# Patient Record
Sex: FEMALE | Race: WHITE | NOT HISPANIC OR LATINO
[De-identification: names, ages, dates, MRNs, and addresses within clinical notes are randomized per-mention and may not be internally consistent; named-entity substitution may affect disease eponyms.]

---

## 2017-02-01 ENCOUNTER — CHARTING TRANS (OUTPATIENT)
Dept: OTHER | Age: 47
End: 2017-02-01

## 2017-02-01 ENCOUNTER — LAB SERVICES (OUTPATIENT)
Dept: OTHER | Age: 47
End: 2017-02-01

## 2017-02-03 LAB — BACTERIA UR CULT: NORMAL

## 2017-03-24 ENCOUNTER — MED REC SCAN ONLY (OUTPATIENT)
Dept: FAMILY MEDICINE CLINIC | Facility: CLINIC | Age: 47
End: 2017-03-24

## 2017-07-22 ENCOUNTER — HOSPITAL (OUTPATIENT)
Dept: OTHER | Age: 47
End: 2017-07-22
Attending: EMERGENCY MEDICINE

## 2017-07-22 LAB
ANALYZER ANC (IANC): NORMAL
ANION GAP SERPL CALC-SCNC: 16 MMOL/L (ref 10–20)
APPEARANCE UR: CLEAR
BASO+EOS+MONOS # BLD: 0.5 THOUSAND/MCL (ref 0.1–1.1)
BASO+EOS+MONOS NFR BLD: 9 %
BILIRUB UR QL STRIP: NEGATIVE
BUN SERPL-MCNC: 13 MG/DL (ref 6–20)
BUN/CREAT SERPL: 26 (ref 7–25)
CHLORIDE: 105 MMOL/L (ref 98–107)
CO2 SERPL-SCNC: 25 MMOL/L (ref 21–32)
COLOR UR: YELLOW
CREAT SERPL-MCNC: 0.5 MG/DL (ref 0.51–0.95)
DIFFERENTIAL METHOD BLD: NORMAL
ERYTHROCYTE [DISTWIDTH] IN BLOOD: 12.6 % (ref 11–15)
GLUCOSE UR STRIP-MCNC: NEGATIVE MG/DL
HEMATOCRIT: 38.3 % (ref 36–46.5)
HEMOCCULT STL QL: ABNORMAL
HGB BLD-MCNC: 13.3 GM/DL (ref 12–15.5)
KETONES UR STRIP-MCNC: NEGATIVE MG/DL
LEUKOCYTE ESTERASE UR QL STRIP: NEGATIVE
LYMPHOCYTES # BLD: 1.1 THOUSAND/MCL (ref 1–4.8)
LYMPHOCYTES NFR BLD: 20 %
MCH RBC QN AUTO: 32.3 PG (ref 26–34)
MCHC RBC AUTO-ENTMCNC: 34.7 GM/DL (ref 32–36.5)
MCV RBC AUTO: 93 FL (ref 78–100)
NEUTROPHILS # BLD: 3.9 THOUSAND/MCL (ref 1.8–7.7)
NEUTROPHILS NFR BLD: 71 %
NEUTS SEG NFR BLD: NORMAL %
NITRITE UR QL STRIP: NEGATIVE
PH UR STRIP: 7 UNIT (ref 5–7)
PLATELET # BLD: 246 THOUSAND/MCL (ref 140–450)
POTASSIUM SERPL-SCNC: 4.5 MMOL/L (ref 3.4–5.1)
PROT UR STRIP-MCNC: NEGATIVE MG/DL
RBC # BLD: 4.12 MILLION/MCL (ref 4–5.2)
SODIUM SERPL-SCNC: 141 MMOL/L (ref 135–145)
SP GR UR STRIP: 1.01 (ref 1–1.03)
UROBILINOGEN UR STRIP-MCNC: 0.2 MG/DL (ref 0–1)
WBC # BLD: 5.5 THOUSAND/MCL (ref 4.2–11)

## 2017-10-24 ENCOUNTER — OFFICE VISIT (OUTPATIENT)
Dept: FAMILY MEDICINE CLINIC | Facility: CLINIC | Age: 47
End: 2017-10-24

## 2017-10-24 VITALS
HEIGHT: 62 IN | HEART RATE: 86 BPM | TEMPERATURE: 98 F | BODY MASS INDEX: 23 KG/M2 | WEIGHT: 125 LBS | SYSTOLIC BLOOD PRESSURE: 108 MMHG | OXYGEN SATURATION: 99 % | DIASTOLIC BLOOD PRESSURE: 74 MMHG

## 2017-10-24 DIAGNOSIS — K64.4 BLEEDING EXTERNAL HEMORRHOIDS: ICD-10-CM

## 2017-10-24 DIAGNOSIS — Z23 NEED FOR VACCINATION: ICD-10-CM

## 2017-10-24 DIAGNOSIS — K62.5 RECTAL BLEEDING: Primary | ICD-10-CM

## 2017-10-24 PROCEDURE — 90686 IIV4 VACC NO PRSV 0.5 ML IM: CPT | Performed by: FAMILY MEDICINE

## 2017-10-24 PROCEDURE — 90471 IMMUNIZATION ADMIN: CPT | Performed by: FAMILY MEDICINE

## 2017-10-24 PROCEDURE — 99213 OFFICE O/P EST LOW 20 MIN: CPT | Performed by: FAMILY MEDICINE

## 2017-10-24 NOTE — PROGRESS NOTES
HPI:   Patient presents with:  Constipation  Rectal Bleeding: only one episode on 10-22-17      Thais Hidalgo is a 49year old female who presents with complaints of single episode of bright red blood/rectal bleeding    · Pain: Denies pain, no history of wheeze  GI:  See above  :  No dysuria  MS:  No joint pain or swelling B  NEURO:  Denies numbness or tingling or weakness  PSYCH:  No mood concerns     EXAM:   /74   Pulse 86   Temp 97.8 °F (36.6 °C) (Oral)   Ht 62\"   Wt 125 lb   LMP 10/06/2017   S INTERNAL    2. Bleeding external hemorrhoids versus? See above    3. Need for vaccination  Flu vaccine today      The patient indicates understanding of the above recommendations and agrees to the above plan.   Follow up: as above      Orders Placed This E

## 2017-12-22 ENCOUNTER — OFFICE VISIT (OUTPATIENT)
Dept: FAMILY MEDICINE CLINIC | Facility: CLINIC | Age: 47
End: 2017-12-22

## 2017-12-22 VITALS
BODY MASS INDEX: 22.63 KG/M2 | SYSTOLIC BLOOD PRESSURE: 108 MMHG | OXYGEN SATURATION: 98 % | HEIGHT: 62 IN | HEART RATE: 94 BPM | WEIGHT: 123 LBS | TEMPERATURE: 98 F | DIASTOLIC BLOOD PRESSURE: 64 MMHG

## 2017-12-22 DIAGNOSIS — N30.01 ACUTE CYSTITIS WITH HEMATURIA: ICD-10-CM

## 2017-12-22 DIAGNOSIS — R30.0 DYSURIA: Primary | ICD-10-CM

## 2017-12-22 PROCEDURE — 81003 URINALYSIS AUTO W/O SCOPE: CPT | Performed by: FAMILY MEDICINE

## 2017-12-22 PROCEDURE — 87186 SC STD MICRODIL/AGAR DIL: CPT | Performed by: FAMILY MEDICINE

## 2017-12-22 PROCEDURE — 87086 URINE CULTURE/COLONY COUNT: CPT | Performed by: FAMILY MEDICINE

## 2017-12-22 PROCEDURE — 99213 OFFICE O/P EST LOW 20 MIN: CPT | Performed by: FAMILY MEDICINE

## 2017-12-22 PROCEDURE — 87077 CULTURE AEROBIC IDENTIFY: CPT | Performed by: FAMILY MEDICINE

## 2017-12-22 RX ORDER — CIPROFLOXACIN 500 MG/1
TABLET, FILM COATED ORAL
Qty: 10 TABLET | Refills: 1 | Status: SHIPPED | OUTPATIENT
Start: 2017-12-22 | End: 2017-12-26

## 2017-12-22 NOTE — PROGRESS NOTES
HPI:   Patient presents with:  UTI: burning and some pain for 1 days       Avis Valdez is a 49year old female who presents with symptoms of UTI    Pt is complaining of urinary frequency, urgency, dysuria, suprapubic pain for 2 days.   Denies: back pain, well developed, well nourished, female in no apparent distress  SKIN: no rashes  HEENT: atraumatic, normocephalic,  EYES: conjunctiva are clear, no discharge  NECK: supple  LUNGS: good BS B, clear to auscultation B, no wheezing and no rhonchi B   CARDIO: R

## 2017-12-26 ENCOUNTER — TELEPHONE (OUTPATIENT)
Dept: FAMILY MEDICINE CLINIC | Facility: CLINIC | Age: 47
End: 2017-12-26

## 2017-12-26 ENCOUNTER — OFFICE VISIT (OUTPATIENT)
Dept: FAMILY MEDICINE CLINIC | Facility: CLINIC | Age: 47
End: 2017-12-26

## 2017-12-26 VITALS
SYSTOLIC BLOOD PRESSURE: 108 MMHG | DIASTOLIC BLOOD PRESSURE: 76 MMHG | HEART RATE: 66 BPM | WEIGHT: 127 LBS | TEMPERATURE: 98 F | BODY MASS INDEX: 23 KG/M2 | RESPIRATION RATE: 16 BRPM

## 2017-12-26 DIAGNOSIS — N30.00 ACUTE CYSTITIS WITHOUT HEMATURIA: Primary | ICD-10-CM

## 2017-12-26 PROCEDURE — 99213 OFFICE O/P EST LOW 20 MIN: CPT | Performed by: FAMILY MEDICINE

## 2017-12-26 RX ORDER — NITROFURANTOIN 25; 75 MG/1; MG/1
100 CAPSULE ORAL 2 TIMES DAILY
Qty: 10 CAPSULE | Refills: 0 | Status: SHIPPED | OUTPATIENT
Start: 2017-12-26 | End: 2017-12-31

## 2017-12-26 NOTE — PROGRESS NOTES
HPI: 49year old female presents c/o rash 2/t Cipro-was taking abx for UTI. Took 2 doses only and rash started. Urine cx reviewed-S to all abx. Allergy to Augmentin prior-rash. Denies lip edema/SOB.  Rash is self resolving with Benadryl-nearly resolved-only visit was spent on counseling and coordination of care. IMPRESSION:  Rash - 2/t Ciprofloxacin (allergy)  UTI    There is no problem list on file for this patient.       PLAN:  -STOP Ciprofloxacin-added to allergy list  -start Macrobid Rx  -OTC Benadryl a

## 2017-12-26 NOTE — PATIENT INSTRUCTIONS
PLAN:  -STOP Ciprofloxacin-added to allergy list  -start Macrobid Rx  -OTC Benadryl as needed  -stay well-hydrated  -advice on UTI prevention provided  -f/u as needed

## 2018-06-27 ENCOUNTER — OFFICE VISIT (OUTPATIENT)
Dept: FAMILY MEDICINE CLINIC | Facility: CLINIC | Age: 48
End: 2018-06-27

## 2018-06-27 ENCOUNTER — LAB ENCOUNTER (OUTPATIENT)
Dept: LAB | Age: 48
End: 2018-06-27
Attending: FAMILY MEDICINE
Payer: COMMERCIAL

## 2018-06-27 VITALS
RESPIRATION RATE: 16 BRPM | HEIGHT: 62 IN | WEIGHT: 127 LBS | DIASTOLIC BLOOD PRESSURE: 72 MMHG | OXYGEN SATURATION: 98 % | BODY MASS INDEX: 23.37 KG/M2 | SYSTOLIC BLOOD PRESSURE: 100 MMHG | HEART RATE: 81 BPM

## 2018-06-27 DIAGNOSIS — K59.04 CHRONIC IDIOPATHIC CONSTIPATION: ICD-10-CM

## 2018-06-27 DIAGNOSIS — Z00.00 ROUTINE MEDICAL EXAM: Primary | ICD-10-CM

## 2018-06-27 DIAGNOSIS — Z00.00 ROUTINE MEDICAL EXAM: ICD-10-CM

## 2018-06-27 DIAGNOSIS — L70.0 ACNE VULGARIS: ICD-10-CM

## 2018-06-27 DIAGNOSIS — Z12.11 SCREENING FOR COLON CANCER: ICD-10-CM

## 2018-06-27 PROCEDURE — 99396 PREV VISIT EST AGE 40-64: CPT | Performed by: FAMILY MEDICINE

## 2018-06-27 PROCEDURE — 36415 COLL VENOUS BLD VENIPUNCTURE: CPT

## 2018-06-27 PROCEDURE — 85025 COMPLETE CBC W/AUTO DIFF WBC: CPT

## 2018-06-27 PROCEDURE — 84443 ASSAY THYROID STIM HORMONE: CPT

## 2018-06-27 PROCEDURE — 80061 LIPID PANEL: CPT

## 2018-06-27 PROCEDURE — 80053 COMPREHEN METABOLIC PANEL: CPT

## 2018-06-27 RX ORDER — TRETINOIN 0.025 %
1 CREAM (GRAM) TOPICAL NIGHTLY
Qty: 45 G | Refills: 1 | Status: SHIPPED | OUTPATIENT
Start: 2018-06-27 | End: 2020-02-04

## 2018-06-27 NOTE — H&P
HPI:   Patient presents with:  Physical  Acne  Constipation      Xena De Los Santos is a 49year old female who presents for a complete physical exam without pap/gyne exam.     Patient has new complaints of:  · Chronic constipation, for years, will go every 2- Diagnosis Date   • ACNE    • ANEMIA    • Cervical cyst    • Concussion    • GERD    • HEADACHES    • Skull fracture ([facility] [address]       Past Surgical History:  09/24/10: HYSTEROSCOPY, STERILIZATION      Comment: Essure  No date:    Family History   P 81   Resp 16   Ht 62\"   Wt 127 lb   LMP 06/22/2018   SpO2 98%   BMI 23.23 kg/m²  Estimated body mass index is 23.23 kg/m² as calculated from the following:    Height as of this encounter: 62\". Weight as of this encounter: 127 lb.   Wt Readings from [place] TSH and CBC annually: Due now  · At 48 y/o Screening Colonoscopy: q 10 years if WNL or as recommended by GI, referred to see Dr. Delphina Ahumada now if chronic constipation worsens  · Immunizations: UTD?  (Discussed Tdap, Flu, Pneumo, Shingles) , patient to check

## 2018-06-28 ENCOUNTER — TELEPHONE (OUTPATIENT)
Dept: FAMILY MEDICINE CLINIC | Facility: CLINIC | Age: 48
End: 2018-06-28

## 2018-09-13 ENCOUNTER — MED REC SCAN ONLY (OUTPATIENT)
Dept: FAMILY MEDICINE CLINIC | Facility: CLINIC | Age: 48
End: 2018-09-13

## 2018-10-21 ENCOUNTER — HOSPITAL (OUTPATIENT)
Dept: OTHER | Age: 48
End: 2018-10-21
Attending: EMERGENCY MEDICINE

## 2018-11-05 VITALS
DIASTOLIC BLOOD PRESSURE: 58 MMHG | RESPIRATION RATE: 20 BRPM | TEMPERATURE: 98.2 F | HEART RATE: 72 BPM | SYSTOLIC BLOOD PRESSURE: 108 MMHG

## 2019-02-02 ENCOUNTER — HOSPITAL (OUTPATIENT)
Dept: OTHER | Age: 49
End: 2019-02-02

## 2019-02-02 ENCOUNTER — DIAGNOSTIC TRANS (OUTPATIENT)
Dept: OTHER | Age: 49
End: 2019-02-02

## 2019-02-02 ENCOUNTER — HOSPITAL (OUTPATIENT)
Dept: OTHER | Age: 49
End: 2019-02-02
Attending: EMERGENCY MEDICINE

## 2019-02-02 LAB
ANALYZER ANC (IANC): NORMAL
ANION GAP SERPL CALC-SCNC: 12 MMOL/L (ref 10–20)
BASOPHILS # BLD: 0 THOUSAND/MCL (ref 0–0.3)
BASOPHILS NFR BLD: 0 %
BUN SERPL-MCNC: 10 MG/DL (ref 6–20)
BUN/CREAT SERPL: 15 (ref 7–25)
CALCIUM SERPL-MCNC: 8.4 MG/DL (ref 8.4–10.2)
CHLORIDE: 103 MMOL/L (ref 98–107)
CO2 SERPL-SCNC: 27 MMOL/L (ref 21–32)
CREAT SERPL-MCNC: 0.67 MG/DL (ref 0.51–0.95)
D DIMER PPP FEU-MCNC: <0.19 MG/L FEU
DIFFERENTIAL METHOD BLD: NORMAL
EOSINOPHIL # BLD: 0.1 THOUSAND/MCL (ref 0.1–0.5)
EOSINOPHIL NFR BLD: 2 %
ERYTHROCYTE [DISTWIDTH] IN BLOOD: 12.1 % (ref 11–15)
GLUCOSE SERPL-MCNC: 101 MG/DL (ref 65–99)
HEMATOCRIT: 42 % (ref 36–46.5)
HGB BLD-MCNC: 13.7 GM/DL (ref 12–15.5)
IMM GRANULOCYTES # BLD AUTO: 0 THOUSAND/MCL (ref 0–0.2)
IMM GRANULOCYTES NFR BLD: 0 %
LYMPHOCYTES # BLD: 1.2 THOUSAND/MCL (ref 1–4.8)
LYMPHOCYTES NFR BLD: 24 %
MAGNESIUM SERPL-MCNC: 1.9 MG/DL (ref 1.7–2.4)
MCH RBC QN AUTO: 30.9 PG (ref 26–34)
MCHC RBC AUTO-ENTMCNC: 32.6 GM/DL (ref 32–36.5)
MCV RBC AUTO: 94.8 FL (ref 78–100)
MONOCYTES # BLD: 0.5 THOUSAND/MCL (ref 0.3–0.9)
MONOCYTES NFR BLD: 9 %
NEUTROPHILS # BLD: 3.3 THOUSAND/MCL (ref 1.8–7.7)
NEUTROPHILS NFR BLD: 65 %
NEUTS SEG NFR BLD: NORMAL %
NRBC (NRBCRE): 0 /100 WBC
PLATELET # BLD: 283 THOUSAND/MCL (ref 140–450)
POTASSIUM SERPL-SCNC: 3.9 MMOL/L (ref 3.4–5.1)
RBC # BLD: 4.43 MILLION/MCL (ref 4–5.2)
SODIUM SERPL-SCNC: 138 MMOL/L (ref 135–145)
TROPONIN I SERPL HS-MCNC: <0.02 NG/ML
TSH SERPL-ACNC: 2.3 MCUNIT/ML (ref 0.35–5)
WBC # BLD: 5.1 THOUSAND/MCL (ref 4.2–11)

## 2019-02-04 ENCOUNTER — HOSPITAL (OUTPATIENT)
Dept: OTHER | Age: 49
End: 2019-02-04

## 2019-02-16 ENCOUNTER — DIAGNOSTIC TRANS (OUTPATIENT)
Dept: OTHER | Age: 49
End: 2019-02-16

## 2019-02-17 ENCOUNTER — HOSPITAL (OUTPATIENT)
Dept: OTHER | Age: 49
End: 2019-02-17
Attending: EMERGENCY MEDICINE

## 2019-03-05 ENCOUNTER — WALK IN (OUTPATIENT)
Dept: URGENT CARE | Age: 49
End: 2019-03-05

## 2019-03-05 DIAGNOSIS — J01.90 ACUTE BACTERIAL SINUSITIS: Primary | ICD-10-CM

## 2019-03-05 DIAGNOSIS — B96.89 ACUTE BACTERIAL SINUSITIS: Primary | ICD-10-CM

## 2019-03-05 PROCEDURE — 99214 OFFICE O/P EST MOD 30 MIN: CPT | Performed by: NURSE PRACTITIONER

## 2019-03-05 RX ORDER — FLUTICASONE PROPIONATE 50 MCG
SPRAY, SUSPENSION (ML) NASAL
Qty: 16 G | Refills: 12 | Status: SHIPPED | OUTPATIENT
Start: 2019-03-05

## 2019-03-05 RX ORDER — DOXYCYCLINE HYCLATE 100 MG/1
100 CAPSULE ORAL 2 TIMES DAILY
Qty: 14 CAPSULE | Refills: 0 | Status: SHIPPED | OUTPATIENT
Start: 2019-03-05 | End: 2019-03-12

## 2019-03-05 ASSESSMENT — ENCOUNTER SYMPTOMS
SHORTNESS OF BREATH: 0
FEVER: 1
DIZZINESS: 0
HEADACHES: 0
WHEEZING: 0
TROUBLE SWALLOWING: 0
FATIGUE: 1
CHILLS: 0
SORE THROAT: 0
SINUS PAIN: 1
SINUS PRESSURE: 1
COUGH: 0

## 2019-03-05 ASSESSMENT — PAIN SCALES - GENERAL: PAINLEVEL: 1-2

## 2019-05-02 ENCOUNTER — HOSPITAL (OUTPATIENT)
Dept: OTHER | Age: 49
End: 2019-05-02
Attending: EMERGENCY MEDICINE

## 2019-06-11 ENCOUNTER — OFFICE VISIT (OUTPATIENT)
Dept: FAMILY MEDICINE CLINIC | Facility: CLINIC | Age: 49
End: 2019-06-11
Payer: COMMERCIAL

## 2019-06-11 VITALS
RESPIRATION RATE: 17 BRPM | DIASTOLIC BLOOD PRESSURE: 62 MMHG | TEMPERATURE: 99 F | HEIGHT: 62 IN | WEIGHT: 124 LBS | SYSTOLIC BLOOD PRESSURE: 112 MMHG | OXYGEN SATURATION: 98 % | HEART RATE: 78 BPM | BODY MASS INDEX: 22.82 KG/M2

## 2019-06-11 DIAGNOSIS — R21 RASH AND NONSPECIFIC SKIN ERUPTION: Primary | ICD-10-CM

## 2019-06-11 DIAGNOSIS — S80.869A INSECT BITE OF LOWER LEG, UNSPECIFIED LATERALITY, INITIAL ENCOUNTER: ICD-10-CM
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
## 2019-06-11 PROCEDURE — 99213 OFFICE O/P EST LOW 20 MIN: CPT | Performed by: FAMILY MEDICINE

## 2019-06-11 RX ORDER — BETAMETHASONE DIPROPIONATE 0.5 MG/G
1 CREAM TOPICAL 2 TIMES DAILY
Qty: 45 G | Refills: 1 | Status: SHIPPED | OUTPATIENT
Start: 2019-06-11 | End: 2020-02-04

## 2019-06-13 NOTE — PROGRESS NOTES
HPI:  Patient presents with:  Bite Sting,Insect (integumentary): 6/2/2019 in [place], bug bites on bilat ankles and bilat arms      Miller Mortimer is a 51year old female who presents with complains of rash     Located on the bilateral lower legs and forearms.   D complaints  EARS:  No hearing loss, no ear pain  MOUTH/THROAT:  No sore throat or dental problems, no oral lesions  HEART:  No chest pain or palpitations  LUNG:  No SOB, cough or wheeze  GI:  No abdominal pain.   No N/V/D/C  MS:  No joint pain or swelling B Cream; Apply 1 Application topically 2 (two) times daily. Dispense: 45 g; Refill: 1    2.  Insect bite of lower leg, unspecified laterality, initial encounter  See above  - betamethasone dipropionate 0.05 % External Cream; Apply 1 Application topically 2 (

## 2020-02-04 ENCOUNTER — OFFICE VISIT (OUTPATIENT)
Dept: FAMILY MEDICINE CLINIC | Facility: CLINIC | Age: 50
End: 2020-02-04
Payer: COMMERCIAL

## 2020-02-04 VITALS
HEIGHT: 62 IN | HEART RATE: 67 BPM | BODY MASS INDEX: 23.55 KG/M2 | DIASTOLIC BLOOD PRESSURE: 68 MMHG | SYSTOLIC BLOOD PRESSURE: 110 MMHG | OXYGEN SATURATION: 97 % | WEIGHT: 128 LBS

## 2020-02-04 DIAGNOSIS — Z00.00 ROUTINE MEDICAL EXAM: Primary | ICD-10-CM

## 2020-02-04 DIAGNOSIS — Z12.31 SCREENING MAMMOGRAM, ENCOUNTER FOR: ICD-10-CM

## 2020-02-04 DIAGNOSIS — Z13.820 SCREENING FOR OSTEOPOROSIS: ICD-10-CM

## 2020-02-04 DIAGNOSIS — Z12.11 SCREENING FOR COLON CANCER: ICD-10-CM

## 2020-02-04 PROCEDURE — 90471 IMMUNIZATION ADMIN: CPT | Performed by: FAMILY MEDICINE

## 2020-02-04 PROCEDURE — 99396 PREV VISIT EST AGE 40-64: CPT | Performed by: FAMILY MEDICINE

## 2020-02-04 PROCEDURE — 90715 TDAP VACCINE 7 YRS/> IM: CPT | Performed by: FAMILY MEDICINE

## 2020-02-04 PROCEDURE — 90472 IMMUNIZATION ADMIN EACH ADD: CPT | Performed by: FAMILY MEDICINE

## 2020-02-04 PROCEDURE — 90686 IIV4 VACC NO PRSV 0.5 ML IM: CPT | Performed by: FAMILY MEDICINE

## 2020-02-04 NOTE — H&P
HPI:   Patient presents with:  Yuridia Amato is a 47year old female who presents for a complete physical exam without pap/gyne exam.     Patient has new complaints of:  · No new    She denies CP, HAs,SOB, Dizziness, Palpitations, Weight los Diabetes Maternal Grandfather    • Cancer Paternal Grandmother         Colon & LUNG CANCER  AT 37   • Heart Disorder Maternal Grandmother    • Heart Disorder Paternal Grandfather    • Other (Other) Sister         ASTHMA   • Other (Other) Brother Last 3 Encounters:  02/04/20 : 128 lb (58.1 kg)  01/15/20 : 125 lb (56.7 kg)  06/11/19 : 124 lb (56.2 kg)    GENERAL: well developed, well nourished, in no apparent distress  SKIN: no rashes, no suspicious lesions, color wnl  HEENT: atraumatic, normocephal return for CPE in 1-2 years. Additional Assessment and Plan:  1. Routine medical exam  Sa  - CT CALCIUM SCORING; Future  - CBC WITH DIFFERENTIAL WITH PLATELET; Future  - COMP METABOLIC PANEL (14); Future  - LIPID PANEL;  Future  - ASSAY, THYROID STIM HOR

## 2020-02-17 ENCOUNTER — OFFICE VISIT (OUTPATIENT)
Dept: FAMILY MEDICINE CLINIC | Facility: CLINIC | Age: 50
End: 2020-02-17
Payer: COMMERCIAL

## 2020-02-17 VITALS
SYSTOLIC BLOOD PRESSURE: 100 MMHG | RESPIRATION RATE: 18 BRPM | WEIGHT: 125 LBS | OXYGEN SATURATION: 99 % | DIASTOLIC BLOOD PRESSURE: 70 MMHG | BODY MASS INDEX: 23 KG/M2 | HEART RATE: 71 BPM | HEIGHT: 62 IN

## 2020-02-17 DIAGNOSIS — M25.512 ACUTE PAIN OF LEFT SHOULDER: ICD-10-CM

## 2020-02-17 DIAGNOSIS — M77.8 TENDINITIS OF LEFT SHOULDER: Primary | ICD-10-CM

## 2020-02-17 PROBLEM — N30.00 ACUTE CYSTITIS WITHOUT HEMATURIA: Status: RESOLVED | Noted: 2017-12-26 | Resolved: 2020-02-17

## 2020-02-17 PROCEDURE — 99213 OFFICE O/P EST LOW 20 MIN: CPT | Performed by: NURSE PRACTITIONER

## 2020-02-17 RX ORDER — NAPROXEN 500 MG/1
500 TABLET ORAL 2 TIMES DAILY WITH MEALS
Qty: 28 TABLET | Refills: 0 | Status: SHIPPED | OUTPATIENT
Start: 2020-02-17 | End: 2020-03-02

## 2020-02-17 NOTE — PATIENT INSTRUCTIONS
-Take naproxen twice a day with food x 10-14 days  -Do stretches 4-5 times per day  -Alternate ice and heat  -Message me in 2 weeks to let me know how you're doing. If the pain is still there, we can refer you to physical therapy at that time.       Exercis 1.  an open doorway. Raise each arm up to the side, bent at 90-degree angles with palms forward. Rest your palms on the door frame. 2. Slowly step forward with one foot.  Feel the stretch in your shoulders and chest. Stand upright and don’t lean fo Frozen shoulder is another name for adhesive capsulitis. This causes restricted movement in the shoulder. If you have frozen shoulder, this stretch may cause discomfort, especially when you first get started.  A few months may pass before you achieve the re Frozen shoulder is another name for adhesive capsulitis, which causes restricted movement in the shoulder. If you have frozen shoulder, this stretch may cause discomfort, especially when you first get started.  A few months may pass before you achieve the r © 2395-9531 [employer] [address]. [address] [facility], [facility] [place]. All rights reserved. This information is not intended as a substitute for professional medical care. Always follow your healthcare professional's instructions.         Exercis · Relax the arm on the painful side, letting it hang straight down. · Slowly begin to swing the relaxed arm. Move it in a small [place], gradually making it bigger if you can. Then reverse the direction. Next, move it backward and forward.  Finally, move it

## 2020-02-17 NOTE — PROGRESS NOTES
Chief Complaint:   Patient presents with:  Pain: left shoulder pain. Noticed it Saturday am after sleeping. patient states she plowed her snow and has a 90 lb dog also that she walks that may contribute to her pain.       HPI:   This is a 47year old female Smokeless tobacco: Never Used    Alcohol use: Yes      Comment: SOCIAL / weekly    Drug use: No    Family History:  Family History   Problem Relation Age of Onset   • Diabetes Maternal Grandfather    • Cancer Paternal Grandmother         Colon & LUNG CANC Weight as of this encounter: 125 lb (56.7 kg). Vital signs reviewed. Appears stated age, well groomed, in no acute distress. Physical Exam:  GEN:  Patient is alert, awake and oriented, well developed, well nourished.   NECK: Supple, no CLAD, no thyromegal shoulder  -See above. - naproxen 500 MG Oral Tab; Take 1 tablet (500 mg total) by mouth 2 (two) times daily with meals for 14 days. Dispense: 28 tablet; Refill: 0    Colonoscopy and mammogram ordered at [address] 2/4/2020, reminded patient to schedule.  She verb

## 2020-02-22 ENCOUNTER — LAB ENCOUNTER (OUTPATIENT)
Dept: LAB | Facility: REFERENCE LAB | Age: 50
End: 2020-02-22
Attending: FAMILY MEDICINE
Payer: COMMERCIAL

## 2020-02-22 DIAGNOSIS — Z00.00 ROUTINE MEDICAL EXAM: ICD-10-CM

## 2020-02-22 LAB
ALBUMIN SERPL-MCNC: 3.8 G/DL (ref 3.4–5)
ALBUMIN/GLOB SERPL: 1.1 {RATIO} (ref 1–2)
ALP LIVER SERPL-CCNC: 52 U/L (ref 39–100)
ALT SERPL-CCNC: 20 U/L (ref 13–56)
ANION GAP SERPL CALC-SCNC: 4 MMOL/L (ref 0–18)
AST SERPL-CCNC: 17 U/L (ref 15–37)
BASOPHILS # BLD AUTO: 0.03 X10(3) UL (ref 0–0.2)
BASOPHILS NFR BLD AUTO: 0.7 %
BILIRUB SERPL-MCNC: 0.4 MG/DL (ref 0.1–2)
BUN BLD-MCNC: 12 MG/DL (ref 7–18)
BUN/CREAT SERPL: 17.6 (ref 10–20)
CALCIUM BLD-MCNC: 8.7 MG/DL (ref 8.5–10.1)
CHLORIDE SERPL-SCNC: 106 MMOL/L (ref 98–112)
CHOLEST SMN-MCNC: 204 MG/DL (ref ?–200)
CO2 SERPL-SCNC: 30 MMOL/L (ref 21–32)
CREAT BLD-MCNC: 0.68 MG/DL (ref 0.55–1.02)
DEPRECATED RDW RBC AUTO: 41.1 FL (ref 35.1–46.3)
EOSINOPHIL # BLD AUTO: 0.1 X10(3) UL (ref 0–0.7)
EOSINOPHIL NFR BLD AUTO: 2.3 %
ERYTHROCYTE [DISTWIDTH] IN BLOOD BY AUTOMATED COUNT: 11.9 % (ref 11–15)
GLOBULIN PLAS-MCNC: 3.6 G/DL (ref 2.8–4.4)
GLUCOSE BLD-MCNC: 89 MG/DL (ref 70–99)
HCT VFR BLD AUTO: 40.5 % (ref 35–48)
HDLC SERPL-MCNC: 73 MG/DL (ref 40–59)
HGB BLD-MCNC: 13.3 G/DL (ref 12–16)
IMM GRANULOCYTES # BLD AUTO: 0.01 X10(3) UL (ref 0–1)
IMM GRANULOCYTES NFR BLD: 0.2 %
LDLC SERPL CALC-MCNC: 116 MG/DL (ref ?–100)
LYMPHOCYTES # BLD AUTO: 1.62 X10(3) UL (ref 1–4)
LYMPHOCYTES NFR BLD AUTO: 37.9 %
M PROTEIN MFR SERPL ELPH: 7.4 G/DL (ref 6.4–8.2)
MCH RBC QN AUTO: 30.8 PG (ref 26–34)
MCHC RBC AUTO-ENTMCNC: 32.8 G/DL (ref 31–37)
MCV RBC AUTO: 93.8 FL (ref 80–100)
MONOCYTES # BLD AUTO: 0.42 X10(3) UL (ref 0.1–1)
MONOCYTES NFR BLD AUTO: 9.8 %
NEUTROPHILS # BLD AUTO: 2.09 X10 (3) UL (ref 1.5–7.7)
NEUTROPHILS # BLD AUTO: 2.09 X10(3) UL (ref 1.5–7.7)
NEUTROPHILS NFR BLD AUTO: 49.1 %
NONHDLC SERPL-MCNC: 131 MG/DL (ref ?–130)
OSMOLALITY SERPL CALC.SUM OF ELEC: 289 MOSM/KG (ref 275–295)
PATIENT FASTING Y/N/NP: YES
PATIENT FASTING Y/N/NP: YES
PLATELET # BLD AUTO: 266 10(3)UL (ref 150–450)
POTASSIUM SERPL-SCNC: 4.4 MMOL/L (ref 3.5–5.1)
RBC # BLD AUTO: 4.32 X10(6)UL (ref 3.8–5.3)
SODIUM SERPL-SCNC: 140 MMOL/L (ref 136–145)
TRIGL SERPL-MCNC: 77 MG/DL (ref 30–149)
TSI SER-ACNC: 1.5 MIU/ML (ref 0.36–3.74)
VLDLC SERPL CALC-MCNC: 15 MG/DL (ref 0–30)
WBC # BLD AUTO: 4.3 X10(3) UL (ref 4–11)

## 2020-02-22 PROCEDURE — 82306 VITAMIN D 25 HYDROXY: CPT

## 2020-02-22 PROCEDURE — 84443 ASSAY THYROID STIM HORMONE: CPT

## 2020-02-22 PROCEDURE — 36415 COLL VENOUS BLD VENIPUNCTURE: CPT

## 2020-02-22 PROCEDURE — 80053 COMPREHEN METABOLIC PANEL: CPT

## 2020-02-22 PROCEDURE — 80061 LIPID PANEL: CPT

## 2020-02-22 PROCEDURE — 85025 COMPLETE CBC W/AUTO DIFF WBC: CPT

## 2020-02-24 LAB — 25(OH)D3 SERPL-MCNC: 24.3 NG/ML (ref 30–100)

## 2020-03-04 ENCOUNTER — PATIENT MESSAGE (OUTPATIENT)
Dept: FAMILY MEDICINE CLINIC | Facility: CLINIC | Age: 50
End: 2020-03-04

## 2020-03-04 DIAGNOSIS — M77.8 TENDINITIS OF LEFT SHOULDER: Primary | ICD-10-CM

## 2020-03-05 ENCOUNTER — HOSPITAL ENCOUNTER (OUTPATIENT)
Dept: BONE DENSITY | Age: 50
Discharge: HOME | End: 2020-03-05
Attending: FAMILY MEDICINE
Payer: COMMERCIAL

## 2020-03-05 DIAGNOSIS — Z13.820 SCREENING FOR OSTEOPOROSIS: ICD-10-CM

## 2020-03-05 PROCEDURE — 77080 DXA BONE DENSITY AXIAL: CPT | Performed by: FAMILY MEDICINE

## 2020-03-05 NOTE — TELEPHONE ENCOUNTER
From: Jennifer Early  To: LINO Frazier  Sent: 3/4/2020 4:53 PM CST  Subject: Visit Follow-up Question    Hi, I saw you on 2/17 for shoulder pain.  I am still experiencing limited range of motion and pain with my shoulder, not much improvement since

## 2020-03-10 ENCOUNTER — TELEPHONE (OUTPATIENT)
Dept: PHYSICAL THERAPY | Facility: HOSPITAL | Age: 50
End: 2020-03-10

## 2020-05-15 ENCOUNTER — HOSPITAL ENCOUNTER (OUTPATIENT)
Dept: CT IMAGING | Age: 50
Discharge: HOME | End: 2020-05-15
Attending: FAMILY MEDICINE

## 2020-05-15 DIAGNOSIS — Z00.00 ROUTINE MEDICAL EXAM: ICD-10-CM

## 2020-05-18 ENCOUNTER — TELEPHONE (OUTPATIENT)
Dept: FAMILY MEDICINE CLINIC | Facility: CLINIC | Age: 50
End: 2020-05-18

## 2020-05-18 DIAGNOSIS — R93.89 ABNORMAL CT SCAN: Primary | ICD-10-CM

## 2020-05-18 DIAGNOSIS — R91.1 LUNG NODULE < 6CM ON CT: ICD-10-CM

## 2020-07-08 ENCOUNTER — TELEPHONE (OUTPATIENT)
Dept: FAMILY MEDICINE CLINIC | Facility: CLINIC | Age: 50
End: 2020-07-08

## 2020-07-08 NOTE — TELEPHONE ENCOUNTER
Spoke to patient, she is asking if the CT Chest ordered after the abnormal CT Calcium Score Overread should be completed 1 year from now. Per message to patient on 5/18/2020 (patient message encounter):     The recommendations are for no follow up due to th

## 2020-08-14 PROCEDURE — 88305 TISSUE EXAM BY PATHOLOGIST: CPT | Performed by: INTERNAL MEDICINE

## 2021-01-19 PROBLEM — N95.2 ATROPHIC VAGINITIS: Status: ACTIVE | Noted: 2021-01-19

## 2021-01-19 PROBLEM — M81.0 OSTEOPOROSIS OF LUMBAR SPINE: Status: ACTIVE | Noted: 2021-01-19

## 2021-02-09 ENCOUNTER — OFFICE VISIT (OUTPATIENT)
Dept: FAMILY MEDICINE CLINIC | Facility: CLINIC | Age: 51
End: 2021-02-09
Payer: COMMERCIAL

## 2021-02-09 VITALS
DIASTOLIC BLOOD PRESSURE: 64 MMHG | BODY MASS INDEX: 23.21 KG/M2 | SYSTOLIC BLOOD PRESSURE: 100 MMHG | WEIGHT: 131 LBS | HEIGHT: 63 IN

## 2021-02-09 DIAGNOSIS — E55.9 VITAMIN D DEFICIENCY: ICD-10-CM

## 2021-02-09 DIAGNOSIS — Z00.00 ROUTINE MEDICAL EXAM: Primary | ICD-10-CM

## 2021-02-09 DIAGNOSIS — M85.80 OSTEOPENIA, UNSPECIFIED LOCATION: ICD-10-CM

## 2021-02-09 PROCEDURE — 3008F BODY MASS INDEX DOCD: CPT | Performed by: FAMILY MEDICINE

## 2021-02-09 PROCEDURE — 3078F DIAST BP <80 MM HG: CPT | Performed by: FAMILY MEDICINE

## 2021-02-09 PROCEDURE — 3074F SYST BP LT 130 MM HG: CPT | Performed by: FAMILY MEDICINE

## 2021-02-09 PROCEDURE — 99396 PREV VISIT EST AGE 40-64: CPT | Performed by: FAMILY MEDICINE

## 2021-02-11 NOTE — H&P
HPI:   Patient presents with:   Nikki Mosher Montse is a 45year old female who presents for a complete physical exam without pap/gyne exam.     Patient has new complaints of:  · none    She  is without complaints of CP, HAs,SOB, Dizziness, Palpitati Past Surgical History:   Procedure Laterality Date   • COLONOSCOPY, POSSIBLE BIOPSY, POSSIBLE POLYPECTOMY 84855 N/A 2020    Performed by Kianna Mcnamara MD at [address] [place] [address]   • HYSTEROSCOPY, STERILIZATION  09/24/10    Essure   •  kg/m²  Estimated body mass index is 23.21 kg/m² as calculated from the following:    Height as of this encounter: 5' 3\" (1.6 m). Weight as of this encounter: 131 lb (59.4 kg).   Wt Readings from Last 3 Encounters:  02/09/21 : 131 lb (59.4 kg)  [birth year]20 y/o Screening Colonoscopy: q 10 years/as recommended by GI, last 8/2020  · Ca+ score of the heart: ), 5/2020  · Immunizations: UTD   · Will follow-up pending results  · The patient is asked to return for CPE in 1-2 years.     Additional Assessment and Plan:

## 2021-02-27 ENCOUNTER — LABORATORY ENCOUNTER (OUTPATIENT)
Dept: LAB | Facility: REFERENCE LAB | Age: 51
End: 2021-02-27
Attending: FAMILY MEDICINE
Payer: COMMERCIAL

## 2021-02-27 DIAGNOSIS — Z00.00 ROUTINE MEDICAL EXAM: ICD-10-CM

## 2021-02-27 DIAGNOSIS — E55.9 VITAMIN D DEFICIENCY: ICD-10-CM

## 2021-02-27 DIAGNOSIS — M85.80 OSTEOPENIA, UNSPECIFIED LOCATION: ICD-10-CM

## 2021-02-27 LAB
ALBUMIN SERPL-MCNC: 3.8 G/DL (ref 3.4–5)
ALBUMIN/GLOB SERPL: 1.1 {RATIO} (ref 1–2)
ALP LIVER SERPL-CCNC: 51 U/L
ALT SERPL-CCNC: 24 U/L
ANION GAP SERPL CALC-SCNC: 1 MMOL/L (ref 0–18)
AST SERPL-CCNC: 18 U/L (ref 15–37)
BASOPHILS # BLD AUTO: 0.02 X10(3) UL (ref 0–0.2)
BASOPHILS NFR BLD AUTO: 0.6 %
BILIRUB SERPL-MCNC: 0.3 MG/DL (ref 0.1–2)
BUN BLD-MCNC: 10 MG/DL (ref 7–18)
BUN/CREAT SERPL: 12.8 (ref 10–20)
CALCIUM BLD-MCNC: 9.4 MG/DL (ref 8.5–10.1)
CHLORIDE SERPL-SCNC: 109 MMOL/L (ref 98–112)
CHOLEST SMN-MCNC: 208 MG/DL (ref ?–200)
CO2 SERPL-SCNC: 31 MMOL/L (ref 21–32)
CREAT BLD-MCNC: 0.78 MG/DL
DEPRECATED RDW RBC AUTO: 41.6 FL (ref 35.1–46.3)
EOSINOPHIL # BLD AUTO: 0.12 X10(3) UL (ref 0–0.7)
EOSINOPHIL NFR BLD AUTO: 3.5 %
ERYTHROCYTE [DISTWIDTH] IN BLOOD BY AUTOMATED COUNT: 11.9 % (ref 11–15)
GLOBULIN PLAS-MCNC: 3.4 G/DL (ref 2.8–4.4)
GLUCOSE BLD-MCNC: 95 MG/DL (ref 70–99)
HCT VFR BLD AUTO: 41.5 %
HDLC SERPL-MCNC: 75 MG/DL (ref 40–59)
HGB BLD-MCNC: 13.4 G/DL
IMM GRANULOCYTES # BLD AUTO: 0 X10(3) UL (ref 0–1)
IMM GRANULOCYTES NFR BLD: 0 %
LDLC SERPL CALC-MCNC: 122 MG/DL (ref ?–100)
LYMPHOCYTES # BLD AUTO: 1.39 X10(3) UL (ref 1–4)
LYMPHOCYTES NFR BLD AUTO: 40.4 %
M PROTEIN MFR SERPL ELPH: 7.2 G/DL (ref 6.4–8.2)
MCH RBC QN AUTO: 30.5 PG (ref 26–34)
MCHC RBC AUTO-ENTMCNC: 32.3 G/DL (ref 31–37)
MCV RBC AUTO: 94.5 FL
MONOCYTES # BLD AUTO: 0.41 X10(3) UL (ref 0.1–1)
MONOCYTES NFR BLD AUTO: 11.9 %
NEUTROPHILS # BLD AUTO: 1.5 X10 (3) UL (ref 1.5–7.7)
NEUTROPHILS # BLD AUTO: 1.5 X10(3) UL (ref 1.5–7.7)
NEUTROPHILS NFR BLD AUTO: 43.6 %
NONHDLC SERPL-MCNC: 133 MG/DL (ref ?–130)
OSMOLALITY SERPL CALC.SUM OF ELEC: 291 MOSM/KG (ref 275–295)
PATIENT FASTING Y/N/NP: YES
PATIENT FASTING Y/N/NP: YES
PLATELET # BLD AUTO: 262 10(3)UL (ref 150–450)
POTASSIUM SERPL-SCNC: 4.9 MMOL/L (ref 3.5–5.1)
RBC # BLD AUTO: 4.39 X10(6)UL
SODIUM SERPL-SCNC: 141 MMOL/L (ref 136–145)
TRIGL SERPL-MCNC: 55 MG/DL (ref 30–149)
TSI SER-ACNC: 1.58 MIU/ML (ref 0.36–3.74)
VLDLC SERPL CALC-MCNC: 11 MG/DL (ref 0–30)
WBC # BLD AUTO: 3.4 X10(3) UL (ref 4–11)

## 2021-02-27 PROCEDURE — 36415 COLL VENOUS BLD VENIPUNCTURE: CPT

## 2021-02-27 PROCEDURE — 82306 VITAMIN D 25 HYDROXY: CPT

## 2021-02-27 PROCEDURE — 80061 LIPID PANEL: CPT

## 2021-02-27 PROCEDURE — 85025 COMPLETE CBC W/AUTO DIFF WBC: CPT

## 2021-02-27 PROCEDURE — 80053 COMPREHEN METABOLIC PANEL: CPT

## 2021-02-27 PROCEDURE — 84443 ASSAY THYROID STIM HORMONE: CPT

## 2021-03-01 DIAGNOSIS — D72.818 OTHER DECREASED WHITE BLOOD CELL (WBC) COUNT: Primary | ICD-10-CM

## 2021-03-01 LAB — 25(OH)D3 SERPL-MCNC: 39.2 NG/ML (ref 30–100)

## 2021-03-01 NOTE — PROGRESS NOTES
Memo Padron,    Below are the results of your recently performed labs/tests: All results are within NORMAL limits EXCEPT:    Your white blood cell count was mildly decreased at 3.4. This is a common usually benign finding.   Just repeat it in 1 miguel

## 2021-03-27 ENCOUNTER — LAB ENCOUNTER (OUTPATIENT)
Dept: LAB | Age: 51
End: 2021-03-27
Attending: FAMILY MEDICINE
Payer: COMMERCIAL

## 2021-03-27 DIAGNOSIS — D72.818 OTHER DECREASED WHITE BLOOD CELL (WBC) COUNT: ICD-10-CM

## 2021-03-27 LAB
BASOPHILS # BLD AUTO: 0.02 X10(3) UL (ref 0–0.2)
BASOPHILS NFR BLD AUTO: 0.5 %
DEPRECATED RDW RBC AUTO: 42.5 FL (ref 35.1–46.3)
EOSINOPHIL # BLD AUTO: 0.16 X10(3) UL (ref 0–0.7)
EOSINOPHIL NFR BLD AUTO: 3.7 %
ERYTHROCYTE [DISTWIDTH] IN BLOOD BY AUTOMATED COUNT: 12.2 % (ref 11–15)
HCT VFR BLD AUTO: 39.2 %
HGB BLD-MCNC: 12.5 G/DL
IMM GRANULOCYTES # BLD AUTO: 0.01 X10(3) UL (ref 0–1)
IMM GRANULOCYTES NFR BLD: 0.2 %
LYMPHOCYTES # BLD AUTO: 1.49 X10(3) UL (ref 1–4)
LYMPHOCYTES NFR BLD AUTO: 34.5 %
MCH RBC QN AUTO: 30.2 PG (ref 26–34)
MCHC RBC AUTO-ENTMCNC: 31.9 G/DL (ref 31–37)
MCV RBC AUTO: 94.7 FL
MONOCYTES # BLD AUTO: 0.47 X10(3) UL (ref 0.1–1)
MONOCYTES NFR BLD AUTO: 10.9 %
NEUTROPHILS # BLD AUTO: 2.17 X10 (3) UL (ref 1.5–7.7)
NEUTROPHILS # BLD AUTO: 2.17 X10(3) UL (ref 1.5–7.7)
NEUTROPHILS NFR BLD AUTO: 50.2 %
PLATELET # BLD AUTO: 248 10(3)UL (ref 150–450)
RBC # BLD AUTO: 4.14 X10(6)UL
WBC # BLD AUTO: 4.3 X10(3) UL (ref 4–11)

## 2021-03-27 PROCEDURE — 85025 COMPLETE CBC W/AUTO DIFF WBC: CPT

## 2021-03-27 PROCEDURE — 36415 COLL VENOUS BLD VENIPUNCTURE: CPT

## 2021-03-29 NOTE — PROGRESS NOTES
Memo Padron,    Below are the results of your recently performed labs/tests: All results are within NORMAL limits:    Please:   Recheck as below: In one year. Follow up:  Pending the results. With me then.     Take care,     Ed Gaston

## 2021-04-01 DIAGNOSIS — Z23 NEED FOR VACCINATION: ICD-10-CM

## 2021-05-26 VITALS
HEART RATE: 70 BPM | OXYGEN SATURATION: 100 % | SYSTOLIC BLOOD PRESSURE: 110 MMHG | TEMPERATURE: 98.5 F | DIASTOLIC BLOOD PRESSURE: 78 MMHG | RESPIRATION RATE: 16 BRPM

## 2022-02-15 ENCOUNTER — OFFICE VISIT (OUTPATIENT)
Dept: FAMILY MEDICINE CLINIC | Facility: CLINIC | Age: 52
End: 2022-02-15
Payer: COMMERCIAL

## 2022-02-15 VITALS
OXYGEN SATURATION: 99 % | SYSTOLIC BLOOD PRESSURE: 100 MMHG | BODY MASS INDEX: 23.4 KG/M2 | DIASTOLIC BLOOD PRESSURE: 66 MMHG | HEIGHT: 62 IN | WEIGHT: 127.19 LBS | HEART RATE: 74 BPM

## 2022-02-15 DIAGNOSIS — Z23 NEED FOR SHINGLES VACCINE: ICD-10-CM

## 2022-02-15 DIAGNOSIS — M81.0 OSTEOPOROSIS OF LUMBAR SPINE: ICD-10-CM

## 2022-02-15 DIAGNOSIS — E55.9 VITAMIN D DEFICIENCY: ICD-10-CM

## 2022-02-15 DIAGNOSIS — Z00.00 ROUTINE MEDICAL EXAM: Primary | ICD-10-CM

## 2022-02-15 DIAGNOSIS — N95.2 ATROPHIC VAGINITIS: ICD-10-CM

## 2022-02-15 PROCEDURE — 3078F DIAST BP <80 MM HG: CPT | Performed by: FAMILY MEDICINE

## 2022-02-15 PROCEDURE — 3008F BODY MASS INDEX DOCD: CPT | Performed by: FAMILY MEDICINE

## 2022-02-15 PROCEDURE — 3074F SYST BP LT 130 MM HG: CPT | Performed by: FAMILY MEDICINE

## 2022-02-15 PROCEDURE — 99396 PREV VISIT EST AGE 40-64: CPT | Performed by: FAMILY MEDICINE

## 2022-03-01 ENCOUNTER — OFFICE VISIT (OUTPATIENT)
Dept: FAMILY MEDICINE CLINIC | Facility: CLINIC | Age: 52
End: 2022-03-01
Payer: COMMERCIAL

## 2022-03-01 VITALS
HEART RATE: 75 BPM | OXYGEN SATURATION: 99 % | DIASTOLIC BLOOD PRESSURE: 68 MMHG | BODY MASS INDEX: 23.37 KG/M2 | WEIGHT: 127 LBS | SYSTOLIC BLOOD PRESSURE: 104 MMHG | HEIGHT: 62 IN

## 2022-03-01 DIAGNOSIS — L57.0 SOLAR KERATOSIS: Primary | ICD-10-CM

## 2022-03-01 PROCEDURE — 3078F DIAST BP <80 MM HG: CPT | Performed by: FAMILY MEDICINE

## 2022-03-01 PROCEDURE — 17000 DESTRUCT PREMALG LESION: CPT | Performed by: FAMILY MEDICINE

## 2022-03-01 PROCEDURE — 3074F SYST BP LT 130 MM HG: CPT | Performed by: FAMILY MEDICINE

## 2022-03-01 PROCEDURE — 3008F BODY MASS INDEX DOCD: CPT | Performed by: FAMILY MEDICINE

## 2022-03-01 PROCEDURE — 17003 DESTRUCT PREMALG LES 2-14: CPT | Performed by: FAMILY MEDICINE

## 2022-03-07 ENCOUNTER — TELEPHONE (OUTPATIENT)
Dept: FAMILY MEDICINE CLINIC | Facility: CLINIC | Age: 52
End: 2022-03-07

## 2022-03-22 ENCOUNTER — HOSPITAL ENCOUNTER (OUTPATIENT)
Dept: BONE DENSITY | Age: 52
Discharge: HOME | End: 2022-03-22
Attending: FAMILY MEDICINE
Payer: COMMERCIAL

## 2022-03-22 DIAGNOSIS — M81.0 OSTEOPOROSIS OF LUMBAR SPINE: ICD-10-CM

## 2022-03-22 PROCEDURE — 77080 DXA BONE DENSITY AXIAL: CPT | Performed by: FAMILY MEDICINE

## 2022-03-24 ENCOUNTER — LAB ENCOUNTER (OUTPATIENT)
Dept: LAB | Age: 52
End: 2022-03-24
Attending: FAMILY MEDICINE
Payer: COMMERCIAL

## 2022-03-24 DIAGNOSIS — Z00.00 ROUTINE MEDICAL EXAM: ICD-10-CM

## 2022-03-24 DIAGNOSIS — E55.9 VITAMIN D DEFICIENCY: ICD-10-CM

## 2022-03-24 DIAGNOSIS — M81.0 OSTEOPOROSIS OF LUMBAR SPINE: ICD-10-CM

## 2022-03-24 LAB
ALBUMIN SERPL-MCNC: 3.6 G/DL (ref 3.4–5)
ALBUMIN/GLOB SERPL: 1.2 {RATIO} (ref 1–2)
ALP LIVER SERPL-CCNC: 51 U/L
ALT SERPL-CCNC: 21 U/L
ANION GAP SERPL CALC-SCNC: 1 MMOL/L (ref 0–18)
AST SERPL-CCNC: 18 U/L (ref 15–37)
BASOPHILS # BLD AUTO: 0.02 X10(3) UL (ref 0–0.2)
BASOPHILS NFR BLD AUTO: 0.5 %
BILIRUB SERPL-MCNC: 0.4 MG/DL (ref 0.1–2)
BUN BLD-MCNC: 13 MG/DL (ref 7–18)
CALCIUM BLD-MCNC: 9.2 MG/DL (ref 8.5–10.1)
CHLORIDE SERPL-SCNC: 109 MMOL/L (ref 98–112)
CHOLEST SERPL-MCNC: 191 MG/DL (ref ?–200)
CO2 SERPL-SCNC: 29 MMOL/L (ref 21–32)
CREAT BLD-MCNC: 0.66 MG/DL
EOSINOPHIL # BLD AUTO: 0.13 X10(3) UL (ref 0–0.7)
EOSINOPHIL NFR BLD AUTO: 3.3 %
ERYTHROCYTE [DISTWIDTH] IN BLOOD BY AUTOMATED COUNT: 12.4 %
FASTING PATIENT LIPID ANSWER: YES
FASTING STATUS PATIENT QL REPORTED: YES
GLOBULIN PLAS-MCNC: 3 G/DL (ref 2.8–4.4)
GLUCOSE BLD-MCNC: 90 MG/DL (ref 70–99)
HCT VFR BLD AUTO: 40 %
HDLC SERPL-MCNC: 70 MG/DL (ref 40–59)
HGB BLD-MCNC: 13 G/DL
IMM GRANULOCYTES # BLD AUTO: 0.01 X10(3) UL (ref 0–1)
IMM GRANULOCYTES NFR BLD: 0.3 %
LDLC SERPL CALC-MCNC: 105 MG/DL (ref ?–100)
LYMPHOCYTES # BLD AUTO: 1.7 X10(3) UL (ref 1–4)
LYMPHOCYTES NFR BLD AUTO: 43.3 %
MCH RBC QN AUTO: 31 PG (ref 26–34)
MCHC RBC AUTO-ENTMCNC: 32.5 G/DL (ref 31–37)
MCV RBC AUTO: 95.2 FL
MONOCYTES # BLD AUTO: 0.42 X10(3) UL (ref 0.1–1)
MONOCYTES NFR BLD AUTO: 10.7 %
NEUTROPHILS # BLD AUTO: 1.65 X10 (3) UL (ref 1.5–7.7)
NEUTROPHILS # BLD AUTO: 1.65 X10(3) UL (ref 1.5–7.7)
NEUTROPHILS NFR BLD AUTO: 41.9 %
NONHDLC SERPL-MCNC: 121 MG/DL (ref ?–130)
OSMOLALITY SERPL CALC.SUM OF ELEC: 288 MOSM/KG (ref 275–295)
PLATELET # BLD AUTO: 252 10(3)UL (ref 150–450)
POTASSIUM SERPL-SCNC: 5.1 MMOL/L (ref 3.5–5.1)
PROT SERPL-MCNC: 6.6 G/DL (ref 6.4–8.2)
RBC # BLD AUTO: 4.2 X10(6)UL
SODIUM SERPL-SCNC: 139 MMOL/L (ref 136–145)
TRIGL SERPL-MCNC: 87 MG/DL (ref 30–149)
TSI SER-ACNC: 1.59 MIU/ML (ref 0.36–3.74)
VIT D+METAB SERPL-MCNC: 48.5 NG/ML (ref 30–100)
VLDLC SERPL CALC-MCNC: 15 MG/DL (ref 0–30)

## 2022-03-24 PROCEDURE — 84443 ASSAY THYROID STIM HORMONE: CPT

## 2022-03-24 PROCEDURE — 80053 COMPREHEN METABOLIC PANEL: CPT

## 2022-03-24 PROCEDURE — 80061 LIPID PANEL: CPT

## 2022-03-24 PROCEDURE — 85025 COMPLETE CBC W/AUTO DIFF WBC: CPT

## 2022-03-24 PROCEDURE — 82306 VITAMIN D 25 HYDROXY: CPT

## 2022-03-26 NOTE — PROGRESS NOTES
Memo Padron,    Attached are the results of your recently performed labs/tests: All results are essentially within NORMAL limits. EXCEPT:    Your white blood cels are slightly decreased as in the past. Just repeat in 3 months. Follow up:  Pending the results. With me then.     Take care,     Jose Trammell MD  3/26/2022    (Report(s) are attached, future lab/test orders or any referrals are in your chart/MyChart or will be mailed to you)

## 2022-04-25 ENCOUNTER — OFFICE VISIT (OUTPATIENT)
Dept: ENDOCRINOLOGY CLINIC | Facility: CLINIC | Age: 52
End: 2022-04-25
Payer: COMMERCIAL

## 2022-04-25 ENCOUNTER — LAB ENCOUNTER (OUTPATIENT)
Dept: LAB | Facility: HOSPITAL | Age: 52
End: 2022-04-25
Attending: INTERNAL MEDICINE
Payer: COMMERCIAL

## 2022-04-25 VITALS
HEART RATE: 69 BPM | BODY MASS INDEX: 23 KG/M2 | WEIGHT: 128 LBS | SYSTOLIC BLOOD PRESSURE: 102 MMHG | DIASTOLIC BLOOD PRESSURE: 65 MMHG

## 2022-04-25 DIAGNOSIS — M81.0 AGE-RELATED OSTEOPOROSIS WITHOUT CURRENT PATHOLOGICAL FRACTURE: ICD-10-CM

## 2022-04-25 DIAGNOSIS — M81.0 AGE-RELATED OSTEOPOROSIS WITHOUT CURRENT PATHOLOGICAL FRACTURE: Primary | ICD-10-CM

## 2022-04-25 LAB — PTH-INTACT SERPL-MCNC: 71.9 PG/ML (ref 18.5–88)

## 2022-04-25 PROCEDURE — 36415 COLL VENOUS BLD VENIPUNCTURE: CPT

## 2022-04-25 PROCEDURE — 83970 ASSAY OF PARATHORMONE: CPT

## 2022-04-25 PROCEDURE — 84080 ASSAY ALKALINE PHOSPHATASES: CPT

## 2022-04-25 PROCEDURE — 82523 COLLAGEN CROSSLINKS: CPT

## 2022-04-25 NOTE — PATIENT INSTRUCTIONS
24 hour urine - first time urinate in AM flush and collect all the urine for the rest of the day including first urination of the next day

## 2022-04-27 LAB — BONE SPECIFIC ALKALINE PHOSPHATASE: 11 UG/L

## 2022-04-28 LAB — C-TELOPEPTIDE, BETA-CROSS-LINK: 227 PG/ML

## 2022-05-01 ENCOUNTER — LAB ENCOUNTER (OUTPATIENT)
Dept: LAB | Facility: HOSPITAL | Age: 52
End: 2022-05-01
Attending: INTERNAL MEDICINE
Payer: COMMERCIAL

## 2022-05-01 DIAGNOSIS — M81.0 AGE-RELATED OSTEOPOROSIS WITHOUT CURRENT PATHOLOGICAL FRACTURE: ICD-10-CM

## 2022-05-01 LAB
CALCIUM 24H UR-SRATE: 264 MG/24HR (ref 42–353)
CREAT UR-SCNC: 1.07 G/24 HR (ref 0.6–1.8)
SODIUM SERPL-SCNC: 125 MMOL/24HR (ref 40–220)
SPECIMEN VOL UR: 3300 ML

## 2022-05-01 PROCEDURE — 82570 ASSAY OF URINE CREATININE: CPT

## 2022-05-01 PROCEDURE — 84300 ASSAY OF URINE SODIUM: CPT

## 2022-05-01 PROCEDURE — 82340 ASSAY OF CALCIUM IN URINE: CPT

## 2023-04-05 ENCOUNTER — PATIENT MESSAGE (OUTPATIENT)
Dept: ENDOCRINOLOGY CLINIC | Facility: CLINIC | Age: 53
End: 2023-04-05

## 2023-04-05 DIAGNOSIS — M81.0 AGE-RELATED OSTEOPOROSIS WITHOUT CURRENT PATHOLOGICAL FRACTURE: Primary | ICD-10-CM

## 2023-04-05 DIAGNOSIS — R82.994 HYPERCALCIURIA: ICD-10-CM

## 2023-04-14 ENCOUNTER — LAB ENCOUNTER (OUTPATIENT)
Dept: LAB | Facility: HOSPITAL | Age: 53
End: 2023-04-14
Attending: INTERNAL MEDICINE
Payer: COMMERCIAL

## 2023-04-14 DIAGNOSIS — M81.0 AGE-RELATED OSTEOPOROSIS WITHOUT CURRENT PATHOLOGICAL FRACTURE: ICD-10-CM

## 2023-04-14 LAB
ANION GAP SERPL CALC-SCNC: 7 MMOL/L (ref 0–18)
BUN BLD-MCNC: 15 MG/DL (ref 7–18)
BUN/CREAT SERPL: 21.1 (ref 10–20)
CALCIUM BLD-MCNC: 9.4 MG/DL (ref 8.5–10.1)
CHLORIDE SERPL-SCNC: 108 MMOL/L (ref 98–112)
CO2 SERPL-SCNC: 26 MMOL/L (ref 21–32)
CREAT BLD-MCNC: 0.71 MG/DL
FASTING STATUS PATIENT QL REPORTED: YES
GFR SERPLBLD BASED ON 1.73 SQ M-ARVRAT: 102 ML/MIN/1.73M2 (ref 60–?)
GLUCOSE BLD-MCNC: 92 MG/DL (ref 70–99)
OSMOLALITY SERPL CALC.SUM OF ELEC: 292 MOSM/KG (ref 275–295)
POTASSIUM SERPL-SCNC: 4.1 MMOL/L (ref 3.5–5.1)
PTH-INTACT SERPL-MCNC: 52.4 PG/ML (ref 18.5–88)
SODIUM SERPL-SCNC: 141 MMOL/L (ref 136–145)

## 2023-04-14 PROCEDURE — 83970 ASSAY OF PARATHORMONE: CPT

## 2023-04-14 PROCEDURE — 80048 BASIC METABOLIC PNL TOTAL CA: CPT

## 2023-04-14 PROCEDURE — 84080 ASSAY ALKALINE PHOSPHATASES: CPT

## 2023-04-14 PROCEDURE — 36415 COLL VENOUS BLD VENIPUNCTURE: CPT

## 2023-04-14 PROCEDURE — 82523 COLLAGEN CROSSLINKS: CPT

## 2023-04-17 LAB — ALKALINE PHOSPHATASE BONE SPECIFIC: 11.5 UG/L

## 2023-04-18 LAB — C-TELOPEPTIDE: 312 PG/ML

## 2023-04-28 ENCOUNTER — OFFICE VISIT (OUTPATIENT)
Dept: ENDOCRINOLOGY CLINIC | Facility: CLINIC | Age: 53
End: 2023-04-28

## 2023-04-28 VITALS
DIASTOLIC BLOOD PRESSURE: 67 MMHG | BODY MASS INDEX: 24 KG/M2 | WEIGHT: 131 LBS | HEART RATE: 76 BPM | SYSTOLIC BLOOD PRESSURE: 107 MMHG

## 2023-04-28 DIAGNOSIS — E55.9 VITAMIN D DEFICIENCY: ICD-10-CM

## 2023-04-28 DIAGNOSIS — M81.0 AGE-RELATED OSTEOPOROSIS WITHOUT CURRENT PATHOLOGICAL FRACTURE: Primary | ICD-10-CM

## 2023-04-28 PROCEDURE — 3078F DIAST BP <80 MM HG: CPT | Performed by: INTERNAL MEDICINE

## 2023-04-28 PROCEDURE — 3074F SYST BP LT 130 MM HG: CPT | Performed by: INTERNAL MEDICINE

## 2023-04-28 PROCEDURE — 99214 OFFICE O/P EST MOD 30 MIN: CPT | Performed by: INTERNAL MEDICINE

## 2023-04-30 ENCOUNTER — WALK IN (OUTPATIENT)
Dept: URGENT CARE | Age: 53
End: 2023-04-30

## 2023-04-30 VITALS
OXYGEN SATURATION: 99 % | RESPIRATION RATE: 16 BRPM | TEMPERATURE: 98.7 F | HEART RATE: 74 BPM | SYSTOLIC BLOOD PRESSURE: 110 MMHG | DIASTOLIC BLOOD PRESSURE: 72 MMHG

## 2023-04-30 DIAGNOSIS — H65.93 BILATERAL OTITIS MEDIA WITH EFFUSION: Primary | ICD-10-CM

## 2023-04-30 PROCEDURE — 99213 OFFICE O/P EST LOW 20 MIN: CPT | Performed by: NURSE PRACTITIONER

## 2023-04-30 RX ORDER — ESTRADIOL 10 UG/1
INSERT VAGINAL
COMMUNITY
Start: 2023-04-25

## 2023-04-30 ASSESSMENT — ENCOUNTER SYMPTOMS
DIZZINESS: 0
RHINORRHEA: 0
VOMITING: 0
VOICE CHANGE: 0
LIGHT-HEADEDNESS: 0
DIARRHEA: 0
SINUS PRESSURE: 0
COUGH: 0
SINUS PAIN: 0
HEADACHES: 0
ABDOMINAL PAIN: 0
SORE THROAT: 0

## 2023-04-30 ASSESSMENT — PAIN SCALES - GENERAL: PAINLEVEL: 2

## 2024-02-22 ENCOUNTER — HOSPITAL ENCOUNTER (OUTPATIENT)
Dept: BONE DENSITY | Age: 54
Discharge: HOME | End: 2024-02-22
Attending: INTERNAL MEDICINE
Payer: COMMERCIAL

## 2024-02-22 DIAGNOSIS — M81.0 AGE-RELATED OSTEOPOROSIS WITHOUT CURRENT PATHOLOGICAL FRACTURE: ICD-10-CM

## 2024-02-22 PROCEDURE — 77080 DXA BONE DENSITY AXIAL: CPT | Performed by: INTERNAL MEDICINE

## 2024-03-06 ENCOUNTER — LAB ENCOUNTER (OUTPATIENT)
Dept: LAB | Age: 54
End: 2024-03-06
Attending: FAMILY MEDICINE
Payer: COMMERCIAL

## 2024-03-06 ENCOUNTER — OFFICE VISIT (OUTPATIENT)
Dept: FAMILY MEDICINE CLINIC | Facility: CLINIC | Age: 54
End: 2024-03-06
Payer: COMMERCIAL

## 2024-03-06 VITALS
DIASTOLIC BLOOD PRESSURE: 76 MMHG | SYSTOLIC BLOOD PRESSURE: 115 MMHG | OXYGEN SATURATION: 98 % | BODY MASS INDEX: 25.72 KG/M2 | HEART RATE: 75 BPM | HEIGHT: 60 IN | WEIGHT: 131 LBS | RESPIRATION RATE: 19 BRPM

## 2024-03-06 DIAGNOSIS — E55.9 VITAMIN D DEFICIENCY: ICD-10-CM

## 2024-03-06 DIAGNOSIS — Z00.00 ANNUAL PHYSICAL EXAM: ICD-10-CM

## 2024-03-06 DIAGNOSIS — M81.0 AGE-RELATED OSTEOPOROSIS WITHOUT CURRENT PATHOLOGICAL FRACTURE: ICD-10-CM

## 2024-03-06 DIAGNOSIS — Z00.00 ANNUAL PHYSICAL EXAM: Primary | ICD-10-CM

## 2024-03-06 LAB
ALBUMIN SERPL-MCNC: 4.5 G/DL (ref 3.2–4.8)
ALBUMIN/GLOB SERPL: 1.7 {RATIO} (ref 1–2)
ALP LIVER SERPL-CCNC: 54 U/L
ALT SERPL-CCNC: 15 U/L
ANION GAP SERPL CALC-SCNC: 4 MMOL/L (ref 0–18)
AST SERPL-CCNC: 25 U/L (ref ?–34)
BILIRUB SERPL-MCNC: 0.6 MG/DL (ref 0.3–1.2)
BUN BLD-MCNC: 12 MG/DL (ref 9–23)
BUN/CREAT SERPL: 14.8 (ref 10–20)
CALCIUM BLD-MCNC: 9.8 MG/DL (ref 8.7–10.4)
CHLORIDE SERPL-SCNC: 108 MMOL/L (ref 98–112)
CHOLEST SERPL-MCNC: 218 MG/DL (ref ?–200)
CO2 SERPL-SCNC: 28 MMOL/L (ref 21–32)
CREAT BLD-MCNC: 0.81 MG/DL
EGFRCR SERPLBLD CKD-EPI 2021: 86 ML/MIN/1.73M2 (ref 60–?)
FASTING PATIENT LIPID ANSWER: NO
FASTING STATUS PATIENT QL REPORTED: NO
GLOBULIN PLAS-MCNC: 2.7 G/DL (ref 2.8–4.4)
GLUCOSE BLD-MCNC: 89 MG/DL (ref 70–99)
HDLC SERPL-MCNC: 75 MG/DL (ref 40–59)
LDLC SERPL CALC-MCNC: 130 MG/DL (ref ?–100)
NONHDLC SERPL-MCNC: 143 MG/DL (ref ?–130)
OSMOLALITY SERPL CALC.SUM OF ELEC: 289 MOSM/KG (ref 275–295)
POTASSIUM SERPL-SCNC: 4.4 MMOL/L (ref 3.5–5.1)
PROT SERPL-MCNC: 7.2 G/DL (ref 5.7–8.2)
SODIUM SERPL-SCNC: 140 MMOL/L (ref 136–145)
TRIGL SERPL-MCNC: 74 MG/DL (ref 30–149)
TSI SER-ACNC: 1.54 MIU/ML (ref 0.55–4.78)
VIT D+METAB SERPL-MCNC: 44.4 NG/ML (ref 30–100)
VLDLC SERPL CALC-MCNC: 13 MG/DL (ref 0–30)

## 2024-03-06 PROCEDURE — 80061 LIPID PANEL: CPT

## 2024-03-06 PROCEDURE — 82306 VITAMIN D 25 HYDROXY: CPT

## 2024-03-06 PROCEDURE — 36415 COLL VENOUS BLD VENIPUNCTURE: CPT

## 2024-03-06 PROCEDURE — 84080 ASSAY ALKALINE PHOSPHATASES: CPT

## 2024-03-06 PROCEDURE — 84443 ASSAY THYROID STIM HORMONE: CPT

## 2024-03-06 PROCEDURE — 80053 COMPREHEN METABOLIC PANEL: CPT

## 2024-03-06 PROCEDURE — 99386 PREV VISIT NEW AGE 40-64: CPT | Performed by: FAMILY MEDICINE

## 2024-03-06 PROCEDURE — 82523 COLLAGEN CROSSLINKS: CPT

## 2024-03-06 RX ORDER — ESTRADIOL 10 UG/1
10 INSERT VAGINAL
COMMUNITY
Start: 2024-02-26 | End: 2025-02-25

## 2024-03-06 NOTE — H&P
HPI:    Xena Padron is a 53 year old female presents to clinic as a new patient to establish care and for annual physical exam.  Lives in [place].  Has an adult son.  About to be a grandmother!  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   Overall healthy.  Sees endocrinology for osteoporosis.  Also sees gynecologist for well woman exams.  Uses vaginal estrogen for atrophic vaginitis.  Normal appetite, balanced diet.  Exercises 4-5 times per week.  No change in sleep habits.  Normal bowel movements and urination.        HISTORY:  Past Medical History:   Diagnosis Date    ACNE     ANEMIA     Cervical cyst     Concussion 1974    GERD     Headache     HEADACHES     Skull fracture (HCC) 1974      Past Surgical History:   Procedure Laterality Date    COLONOSCOPY N/A 2020    Procedure: COLONOSCOPY, POSSIBLE BIOPSY, POSSIBLE POLYPECTOMY 74523;  Surgeon: Sabino Cardona MD;  [facility]    HYSTEROSCOPY, STERILIZATION  2010    Essure          TUBAL LIGATION        Family History   Problem Relation Age of Onset    Diabetes Maternal Grandfather     Cancer Paternal Grandmother         Colon/lung    Heart Disorder Maternal Grandmother     Heart Disorder Paternal Grandfather     Other (Other) Sister         ASTHMA    Other (Other) Brother         ASTHMA    Asthma Brother     Other (Osteopenia) Mother     Hypertension Mother     High Cholesterol Father     Hypertension Father     Cancer Father         Bladder    Pulmonary Disease Father         COPD    Stroke Father         May 2021      Social History:   Social History     Socioeconomic History    Marital status:    Tobacco Use    Smoking status: Never    Smokeless tobacco: Never   Vaping Use    Vaping Use: Never used   Substance and Sexual Activity    Alcohol use: Yes     Alcohol/week: 1.0 standard drink of alcohol     Types: 1 Glasses of wine per week     Comment: SOCIAL / weekly    Drug use: No    Sexual activity: Yes     Partners:  Male     Comment: ESSURE        Medications (Active prior to today's visit):  Current Outpatient Medications   Medication Sig Dispense Refill    Estradiol 10 MCG Vaginal Tab Place 10 mcg vaginally twice a week.      Calcium Carb-Cholecalciferol 600-800 MG-UNIT Oral Tab       cholecalciferol 25 MCG (1000 UT) Oral Cap       Polyethylene Glycol 3350 (MIRALAX OR) Take by mouth daily.      Multiple Vitamin (DAILY VITALY) Oral Tab Take 1 tablet by mouth daily.         Allergies:  Allergies   Allergen Reactions    Augmentin [Amoxicillin-Pot Clavulanate] RASH    Ciprofloxacin RASH         Depression Screening (PHQ-2/PHQ-9): Over the LAST 2 WEEKS   Little interest or pleasure in doing things: Not at all    Feeling down, depressed, or hopeless: Not at all    PHQ-2 SCORE: 0           ROS:   Review of Systems   All other systems reviewed and are negative.      PHYSICAL EXAM:     Vitals:    03/06/24 0813   BP: 115/76   BP Location: Left arm   Patient Position: Sitting   Cuff Size: adult   Pulse: 75   Resp: 19   SpO2: 98%   Weight: 131 lb (59.4 kg)   Height: 5' (1.524 m)     Physical Exam  Vitals reviewed.   Constitutional:       General: She is not in acute distress.  HENT:      Head: Normocephalic and atraumatic.      Right Ear: Tympanic membrane, ear canal and external ear normal.      Left Ear: Tympanic membrane, ear canal and external ear normal.      Nose: Nose normal.      Mouth/Throat:      Pharynx: Uvula midline.   Eyes:      Conjunctiva/sclera: Conjunctivae normal.      Pupils: Pupils are equal, round, and reactive to light.   Neck:      Thyroid: No thyromegaly.   Cardiovascular:      Rate and Rhythm: Normal rate and regular rhythm.      Heart sounds: Normal heart sounds. No murmur heard.  Pulmonary:      Effort: Pulmonary effort is normal. No respiratory distress.      Breath sounds: Normal breath sounds. No wheezing or rales.   Abdominal:      General: Bowel sounds are normal. There is no distension.      Palpations:  Abdomen is soft.      Tenderness: There is no abdominal tenderness. There is no guarding or rebound.   Musculoskeletal:      Cervical back: Normal range of motion and neck supple.   Lymphadenopathy:      Cervical: No cervical adenopathy.   Neurological:      Mental Status: She is alert.         ASSESSMENT/PLAN:   (Z00.00) Annual physical exam  (primary encounter diagnosis)  Plan: Lipid Panel [E], Comp Metabolic Panel (14) [E],        TSH W Reflex To Free T4 [E]  - IWill return for Shingles vaccine   - Reinforced healthy diet, lifestyle, and exercise.  - Past Medical/Social/Family histories reviewed  - Regular dental visits recommended   - Regular eye exams recommended     Health Maintenance   Topic Date Due    Pap Smear  01/15/2025     Health Maintenance   Topic Date Due    Mammogram  05/22/2024      Health Maintenance   Topic Date Due    Colorectal Cancer Screening  08/14/2030      Follow up in 1 year or sooner if needed               Responsible party/patient verbalized understanding of information discussed. No barriers to learning observed.          Orders This Visit:  Orders Placed This Encounter   Procedures    Lipid Panel [E]    Comp Metabolic Panel (14) [E]    TSH W Reflex To Free T4 [E]    Zoster Recombinant Adjuvanted (Shingrix -Shingles) [49631]       Meds This Visit:  Requested Prescriptions      No prescriptions requested or ordered in this encounter       Imaging & Referrals:  ZOSTER VACC RECOMBINANT IM NJX       The 21st Century cures Act makes medical notes like these available to patients in the interest of transparency.  However, be advised that this is a medical document.  It is intended as peer to peer communication.  It is written in medical language and may contain abbreviations or verbiage that are unfamiliar.  It may appear blunt or direct.  Medical documents are intended to carry relevant information, facts as evident, and the clinical opinion of the practitioner.      This note was created  by Dragon voice recognition. Errors in content may be related to improper recognition by the system; efforts to review and correct have been done but errors may still exist. Please contact me with any questions.       3/6/2024  Macho Rosario MD

## 2024-03-09 LAB — ALKALINE PHOSPHATASE BONE SPECIFIC: 11.9 UG/L

## 2024-03-10 LAB — C-TELOPEPTIDE: 171 PG/ML

## 2024-04-24 ENCOUNTER — TELEMEDICINE (OUTPATIENT)
Dept: FAMILY MEDICINE CLINIC | Facility: CLINIC | Age: 54
End: 2024-04-24
Payer: COMMERCIAL

## 2024-04-24 DIAGNOSIS — M54.6 ACUTE RIGHT-SIDED THORACIC BACK PAIN: Primary | ICD-10-CM

## 2024-04-24 DIAGNOSIS — M54.10 RADICULAR PAIN: ICD-10-CM

## 2024-04-24 PROCEDURE — 99213 OFFICE O/P EST LOW 20 MIN: CPT | Performed by: FAMILY MEDICINE

## 2024-04-24 NOTE — PROGRESS NOTES
HPI:    Xena Padron is a 53 year old female presents for video visit with concerns regarding back pain.  For the past 2 weeks, patient has been experiencing a tingling/burning pain on the right side of her thoracic back.  Felt similar to shingles, but has not had a rash in the past 3 weeks.  States that certain positions exacerbate pain, this typically improves with heat.  Feels symptoms are more frequent.  Exercises regularly, pain does not interfere with physical activity.    HISTORY:  Past Medical History:    ACNE    ANEMIA    Cervical cyst    Concussion    GERD    Headache    HEADACHES    Skull fracture (HCC)      Past Surgical History:   Procedure Laterality Date    Colonoscopy N/A 2020    Procedure: COLONOSCOPY, POSSIBLE BIOPSY, POSSIBLE POLYPECTOMY 79095;  Surgeon: Sabino Cardona MD;  [facility]: [facility]    Hysteroscopy, sterilization  2010    Essure          Tubal ligation        Family History   Problem Relation Age of Onset    Diabetes Maternal Grandfather     Cancer Paternal Grandmother         Colon/lung    Heart Disorder Maternal Grandmother     Heart Disorder Paternal Grandfather     Other (Other) Sister         ASTHMA    Other (Other) Brother         ASTHMA    Asthma Brother     Other (Osteopenia) Mother     Hypertension Mother     High Cholesterol Father     Hypertension Father     Cancer Father         Bladder    Pulmonary Disease Father         COPD    Stroke Father         May 2021      Social History:   Social History     Socioeconomic History    Marital status:    Tobacco Use    Smoking status: Never    Smokeless tobacco: Never   Vaping Use    Vaping status: Never Used   Substance and Sexual Activity    Alcohol use: Yes     Alcohol/week: 1.0 standard drink of alcohol     Types: 1 Glasses of wine per week     Comment: SOCIAL / weekly    Drug use: No    Sexual activity: Yes     Partners: Male     Comment: ESSURE        Medications (Active prior to today's  visit):  Current Outpatient Medications   Medication Sig Dispense Refill    Estradiol 10 MCG Vaginal Tab Place 10 mcg vaginally twice a week.      Calcium Carb-Cholecalciferol 600-800 MG-UNIT Oral Tab       cholecalciferol 25 MCG (1000 UT) Oral Cap       Polyethylene Glycol 3350 (MIRALAX OR) Take by mouth daily.      Multiple Vitamin (DAILY VITALY) Oral Tab Take 1 tablet by mouth daily.         Allergies:  Allergies   Allergen Reactions    Augmentin [Amoxicillin-Pot Clavulanate] RASH    Ciprofloxacin RASH         Depression Screening (PHQ-2/PHQ-9): Over the LAST 2 WEEKS                         ROS:   Review of Systems   All other systems reviewed and are negative.      PHYSICAL EXAM:   There were no vitals filed for this visit.  Physical Exam  Constitutional:       General: She is not in acute distress.  Pulmonary:      Effort: Pulmonary effort is normal.   Neurological:      Mental Status: She is alert.         ASSESSMENT/PLAN:   (M54.6) Acute right-sided thoracic back pain  (primary encounter diagnosis)  (M54.10) Radicular pain  Plan: Physical Therapy Referral - [facility]  - supportive care measures discussed. Physical therapy referral placed. Follow up if symptoms worsen or do not improve. Patient agreeable to plan.         I conducted a telehealth visit with the above named patient, which was completed using two-way, real-time interactive audio and video communication. This has been done in good dolores to provide continuity of care in the best interest of the provider-patient relationship, due to the COVID -19 public health crisis/national emergency where restrictions of face-to-face office visits are ongoing. Every conscious effort was taken to allow for sufficient and adequate time to complete the visit.  The patient was made aware of the limitations of the telehealth visit, including treatment limitations as no physical exam could be performed.  The patient was advised to call 911 or to go to the ER in  case there was an emergency.  The patient was also advised of the potential privacy & security concerns related to the telehealth platform.   The patient was made aware of where to find Critical access hospital's notice of privacy practices, telehealth consent form and other related consent forms and documents.  which are located on the Critical access hospital website. The patient verbally agreed to telehealth consent form, related consents and the risks discussed.    Lastly, the patient confirmed that they were in [place].   Included in this visit, time may have been spent reviewing labs, medications, radiology tests and decision making. Appropriate medical decision-making and tests are ordered as detailed in the plan of care above.  Coding/billing information is submitted for this visit based on complexity of care and/or time spent for the visit.           Responsible party/patient verbalized understanding of information discussed. No barriers to learning observed.            Orders This Visit:  No orders of the defined types were placed in this encounter.      Meds This Visit:  Requested Prescriptions      No prescriptions requested or ordered in this encounter       Imaging & Referrals:  PHYSICAL THERAPY - INTERNAL     Chaperone offered at visit today.     The 21st Century cures Act makes medical notes like these available to patients in the interest of transparency.  However, be advised that this is a medical document.  It is intended as peer to peer communication.  It is written in medical language and may contain abbreviations or verbiage that are unfamiliar.  It may appear blunt or direct.  Medical documents are intended to carry relevant information, facts as evident, and the clinical opinion of the practitioner.      This note was created by [employer] voice recognition. Errors in content may be related to improper recognition by the system; efforts to review and correct have been done but errors may still exist. Please contact me with any  questions.       4/24/2024  Macho Rosario MD

## 2024-05-17 ENCOUNTER — OFFICE VISIT (OUTPATIENT)
Dept: ENDOCRINOLOGY CLINIC | Facility: CLINIC | Age: 54
End: 2024-05-17

## 2024-05-17 ENCOUNTER — TELEPHONE (OUTPATIENT)
Dept: ENDOCRINOLOGY CLINIC | Facility: CLINIC | Age: 54
End: 2024-05-17

## 2024-05-17 VITALS
BODY MASS INDEX: 25.72 KG/M2 | SYSTOLIC BLOOD PRESSURE: 106 MMHG | WEIGHT: 131 LBS | HEART RATE: 63 BPM | HEIGHT: 60 IN | DIASTOLIC BLOOD PRESSURE: 67 MMHG

## 2024-05-17 DIAGNOSIS — M81.0 AGE-RELATED OSTEOPOROSIS WITHOUT CURRENT PATHOLOGICAL FRACTURE: Primary | ICD-10-CM

## 2024-05-17 PROCEDURE — 99214 OFFICE O/P EST MOD 30 MIN: CPT | Performed by: INTERNAL MEDICINE

## 2024-05-17 RX ORDER — TERIPARATIDE 250 UG/ML
20 INJECTION, SOLUTION SUBCUTANEOUS DAILY
Qty: 2.24 ML | Refills: 5 | Status: SHIPPED | OUTPATIENT
Start: 2024-05-17 | End: 2024-06-14

## 2024-05-17 RX ORDER — PEN NEEDLE, DIABETIC 32GX 5/32"
NEEDLE, DISPOSABLE MISCELLANEOUS
Qty: 100 EACH | Refills: 1 | Status: SHIPPED | OUTPATIENT
Start: 2024-05-17

## 2024-05-17 NOTE — PROGRESS NOTES
Per MD request patient was educated how to inject Forteo in the office.   used: no  Medication information sheet provided; yes    This RN discussed that this medication should be stored in the refrigerator when not in use. Store your FORTEO pen in the refrigerator at 36° to 46°F (2° to 8°C) at all times. You should inject FORTEO right after you take the pen out of the refrigerator. Put the pen back into the refrigerator right after use.    Patient requested traveling instructions. Per  website, one pen may be kept at room temperature for up to 36 hours while traveling. Not to exceed 77 degrees fahrenheit. Letter will be sent to her [employer] account for airplane travel with medication.    This RN discussed the proper injection locations for this medication: subcutaneous injection into the fatty tissue of the upper outer arms, abdomen, or upper outer thigh. To rotate injection sites to prevent scar tissue from forming.     This RN demonstrated the correct administration technique in front of the patient: wash your hands, cleanse the injection site with an alcohol wipe, check expiration date, inspect the medication (make sure it is not cloudy or has particles in it), remove the cap, screw on clean needle, pull back injection button until it reaches red line and stops. Inject medication into subcutaneous tissue at a 90 degree angle, push and hold injection button for 10 seconds. remove the pen, safely dispose of the pen needle in a sharps container or state approved container.     The patient demonstrated the technique back to this RN and was allowed to practice until he/she felt comfortable.   This RN answered their questions.   Written medication information sheet provided along with description of injection technique.   Patient advised to call the office if there are any issues with insurance coverage or cost of the medication.   Total of 10 minutes spent discussing the above with the patient  and providing education.

## 2024-05-17 NOTE — TELEPHONE ENCOUNTER
Letter looks good - ok to send to [facility]. I'm sorry I can't figure out how to do it in Telephone encounter.

## 2024-05-17 NOTE — TELEPHONE ENCOUNTER
Patient seen today by Dr. Ochoa. Started on forteo. Teaching provided. She will be traveling soon and is requesting letter for airline. Letter pending MD review, once signed will be sent to [facility].

## 2024-05-17 NOTE — PROGRESS NOTES
Name: Xena Padron  Date: 5/17/2024    Referring Physician: No ref. provider found    Chief Complaint   Patient presents with    Osteoporosis     Pt is in for a Osteoporosis follow up. No recent falls/trips No fracture or broken bones       HISTORY OF PRESENT ILLNESS   Xena Padron is a 53 year old female who presents for   Chief Complaint   Patient presents with    Osteoporosis     Pt is in for a Osteoporosis follow up. No recent falls/trips No fracture or broken bones     52 y/o F presents for follow up evaluation of osteoporosis. No h/o fractures.  No h/o nephrolithiasis.  She is doing weight bearing exercise.  No h/o prednisone therapy.  No h/o tobacco use.     She is maintained on Caltrate 600mg and Vitamin D 2800 units daily.  She is having yogurt and mild in diet.      Menopause at age 49.  No h/o HRT.  She is using vaginal estradiol.  No significant hot flashes.      Of note she is maintained on miralax daily to prevent constipation.     She did undergo secondary evaluation which was negative.  Normal 24 hour urine.  No falls or fracture.     However DEXA demonstrates significant decline in BMD     No family h/o osteoporosis.      REVIEW OF SYSTEMS  Eyes: no change in vision  Neurologic: no headache, generalized or focal weakness or numbness.  Head: normal  ENT: normal  Lungs: no shortness of breath, wheezing or BLANKENSHIP  Cardiovascular:  no chest pain or palpitations  Gastrointestinal:  no abdominal pain, bowel movement problems  Musculoskeletal: no muscle pain or arthralgia  /Gyne: no frequency or discomfort while urinating  Psychiatric:  no acute distress, anxiety  or depression  Skin: normal moisturized skin    Medications:     Current Outpatient Medications:     Estradiol 10 MCG Vaginal Tab, Place 10 mcg vaginally twice a week., Disp: , Rfl:     Calcium Carb-Cholecalciferol 600-800 MG-UNIT Oral Tab, , Disp: , Rfl:     cholecalciferol 25 MCG (1000 UT) Oral Cap, , Disp: , Rfl:     Polyethylene Glycol 3350  (MIRALAX OR), Take by mouth daily., Disp: , Rfl:     Multiple Vitamin (DAILY VITALY) Oral Tab, Take 1 tablet by mouth daily., Disp: , Rfl:      Allergies:   Allergies   Allergen Reactions    Augmentin [Amoxicillin-Pot Clavulanate] RASH    Ciprofloxacin RASH       Social History:   Social History     Socioeconomic History    Marital status:    Tobacco Use    Smoking status: Never    Smokeless tobacco: Never   Vaping Use    Vaping status: Never Used   Substance and Sexual Activity    Alcohol use: Yes     Alcohol/week: 1.0 standard drink of alcohol     Types: 1 Glasses of wine per week     Comment: SOCIAL / weekly    Drug use: No    Sexual activity: Yes     Partners: Male     Comment: ESSURE       Medical History:   Past Medical History:    ACNE    ANEMIA    Cervical cyst    Concussion    GERD    Headache    HEADACHES    Skull fracture (HCC)       Surgical history:   Past Surgical History:   Procedure Laterality Date    Colonoscopy N/A 2020    Procedure: COLONOSCOPY, POSSIBLE BIOPSY, POSSIBLE POLYPECTOMY 99245;  Surgeon: Sabino Cardona MD;  [facility]: [facility], [facility]    Hysteroscopy, sterilization  2010    Essure          Tubal ligation         PHYSICAL EXAMINATION:  /67   Pulse 63   Ht 5' (1.524 m)   Wt 131 lb (59.4 kg)   LMP 2019   BMI 25.58 kg/m²     General Appearance:  Alert, in no acute distress, well developed  Eyes: normal conjunctivae, sclera.  Ears/Nose/Mouth/Throat/Neck:  normal hearing, normal speech and diffusely enlarged thyroid gland  Musculoskeletal:  normal muscle strength and tone  PV: normal pulses of carotids, pedals  Skin:  normal moisture and skin texture  Hair & Nails:  normal scalp hair     Neuro:  sensory grossly intact and motor grossly intact  Psychiatric:  oriented to time, self, and place  Nutritional:  no abnormal weight gain or loss    ASSESSMENT/PLAN:    1. Osteoporosis   - Discussed diagnosis with patient  - Discussed importance of  evaluation for secondary causes of bone loss   - FRAX score is not significantly elevated  - Secondary work up was negative  - DEXA demonstrates worsening osteoporosis and reviewed images with patient  - Discussed treatment options at length oral bisphosphonate vs. Reclast vs. Prolia vs. Forteo  - She agrees to proceed with forteo therapy, verbalized understanding of risks and benefits   - Demonstrated injection during OV   - Bone markers are stable  - Recheck BMP, PTH in 4 weeks     A total of 30 minutes was spent on obtaining history, reviewing pertinent labs, evaluating patient, providing multiple treatment options, reinforcing diet/exercise and compliance, and completing documentation.       RTC 4 months     5/17/2024  Dominga Ochoa MD

## 2024-05-20 ENCOUNTER — TELEPHONE (OUTPATIENT)
Dept: ENDOCRINOLOGY CLINIC | Facility: CLINIC | Age: 54
End: 2024-05-20

## 2024-05-20 DIAGNOSIS — M81.0 AGE-RELATED OSTEOPOROSIS WITHOUT CURRENT PATHOLOGICAL FRACTURE: Primary | ICD-10-CM

## 2024-05-20 NOTE — TELEPHONE ENCOUNTER
[facility] pharmacy called for prior authorization for the medication Teriparatide.  Pharmacist states a Fax was sent over on 5/17 and wanted to confirm that  Dr. Ochoa's office received the fax.     Teriparatide, Recombinant, (FORTEO) 600 MCG/2.4ML Subcutaneous Solution Pen-injector, Inject 20 mcg into the skin daily for 28 days., Disp: 2.24 mL, Rfl: 5

## 2024-05-20 NOTE — TELEPHONE ENCOUNTER
PA for Teriparatide, Recombinant, (FORTEO) 600 MCG/2.4ML Subcutaneous Solution Pen-injector   completed via [employer]            Case Id  [id number]  Reference Id  [id number]

## 2024-05-23 RX ORDER — ABALOPARATIDE 2000 UG/ML
80 INJECTION, SOLUTION SUBCUTANEOUS DAILY
Qty: 1.56 ML | Refills: 5 | Status: SHIPPED | OUTPATIENT
Start: 2024-05-23

## 2024-05-23 NOTE — TELEPHONE ENCOUNTER
Called pt and notified of Forteo denial. Pt agreeable to starting Tymlos. Rx sent to pharmacy. Explained MOA, storage, and SE. Pt verbalized understanding.

## 2024-05-28 ENCOUNTER — TELEPHONE (OUTPATIENT)
Dept: ENDOCRINOLOGY CLINIC | Facility: CLINIC | Age: 54
End: 2024-05-28

## 2024-05-28 NOTE — TELEPHONE ENCOUNTER
Patient states that Tymlos will also need a prior auth per the pharmacy.  Please call.        Current Outpatient Medications:     Abaloparatide (TYMLOS) 3120 MCG/1.56ML Subcutaneous Solution Pen-injector, Inject 80 mcg into the skin daily., Disp: 1.56 mL, Rfl: 5

## 2024-05-28 NOTE — TELEPHONE ENCOUNTER
Medication PA Requested: TYMLOS) 3120 MCG/1.56ML Subcutaneous Solution Pen-injector,                                                          CoverMyMeds Used:  Key:  Quantity: 1.56 mL   Day Supply: 30  Sig:  Inject 80 mcg into the skin daily.   DX Code:   M81.0

## 2024-05-29 NOTE — TELEPHONE ENCOUNTER
Medication PA Requested: TYMLOS) 3120 MCG/1.56ML Subcutaneous Solution Pen-injector,                                                          CoverMyMeds Used: No  Key:  Quantity: 1.56 mL   Day Supply: 30  Sig:  Inject 80 mcg into the skin daily.   DX Code:   M81.0        ePA submitted LOV 5/17/24 and Dexa 2/22/24  Awaiting determination

## 2024-06-04 ENCOUNTER — TELEPHONE (OUTPATIENT)
Dept: ENDOCRINOLOGY CLINIC | Facility: CLINIC | Age: 54
End: 2024-06-04

## 2024-06-04 DIAGNOSIS — M81.0 AGE-RELATED OSTEOPOROSIS WITHOUT CURRENT PATHOLOGICAL FRACTURE: ICD-10-CM

## 2024-06-04 RX ORDER — ABALOPARATIDE 2000 UG/ML
80 INJECTION, SOLUTION SUBCUTANEOUS DAILY
Qty: 1.56 ML | Refills: 5 | Status: SHIPPED | OUTPATIENT
Start: 2024-06-04

## 2024-06-04 NOTE — TELEPHONE ENCOUNTER
Patient states that she received a call from [facility].  She was informed that Tymlos can not be filled at [facility] and will need to go to [facility]  [phone]  Patient is requesting the prescription to be transferred to [facility].

## 2024-07-12 ENCOUNTER — LAB ENCOUNTER (OUTPATIENT)
Dept: LAB | Facility: HOSPITAL | Age: 54
End: 2024-07-12
Attending: INTERNAL MEDICINE
Payer: COMMERCIAL

## 2024-07-12 DIAGNOSIS — M81.0 AGE-RELATED OSTEOPOROSIS WITHOUT CURRENT PATHOLOGICAL FRACTURE: ICD-10-CM

## 2024-07-12 LAB
ANION GAP SERPL CALC-SCNC: 4 MMOL/L (ref 0–18)
BUN BLD-MCNC: 16 MG/DL (ref 9–23)
BUN/CREAT SERPL: 19.8 (ref 10–20)
CALCIUM BLD-MCNC: 9.7 MG/DL (ref 8.7–10.4)
CHLORIDE SERPL-SCNC: 109 MMOL/L (ref 98–112)
CO2 SERPL-SCNC: 30 MMOL/L (ref 21–32)
CREAT BLD-MCNC: 0.81 MG/DL
EGFRCR SERPLBLD CKD-EPI 2021: 86 ML/MIN/1.73M2 (ref 60–?)
FASTING STATUS PATIENT QL REPORTED: YES
GLUCOSE BLD-MCNC: 92 MG/DL (ref 70–99)
OSMOLALITY SERPL CALC.SUM OF ELEC: 297 MOSM/KG (ref 275–295)
POTASSIUM SERPL-SCNC: 4.8 MMOL/L (ref 3.5–5.1)
PTH-INTACT SERPL-MCNC: 47.8 PG/ML (ref 18.5–88)
SODIUM SERPL-SCNC: 143 MMOL/L (ref 136–145)

## 2024-07-12 PROCEDURE — 36415 COLL VENOUS BLD VENIPUNCTURE: CPT

## 2024-07-12 PROCEDURE — 80048 BASIC METABOLIC PNL TOTAL CA: CPT

## 2024-07-12 PROCEDURE — 83970 ASSAY OF PARATHORMONE: CPT

## 2024-08-13 ENCOUNTER — OFFICE VISIT (OUTPATIENT)
Dept: FAMILY MEDICINE CLINIC | Facility: CLINIC | Age: 54
End: 2024-08-13
Payer: COMMERCIAL

## 2024-08-13 ENCOUNTER — LAB ENCOUNTER (OUTPATIENT)
Dept: LAB | Age: 54
End: 2024-08-13
Attending: FAMILY MEDICINE
Payer: COMMERCIAL

## 2024-08-13 VITALS
WEIGHT: 129 LBS | RESPIRATION RATE: 18 BRPM | OXYGEN SATURATION: 98 % | DIASTOLIC BLOOD PRESSURE: 76 MMHG | SYSTOLIC BLOOD PRESSURE: 118 MMHG | HEIGHT: 60 IN | HEART RATE: 90 BPM | BODY MASS INDEX: 25.32 KG/M2

## 2024-08-13 DIAGNOSIS — R00.0 TACHYCARDIA: Primary | ICD-10-CM

## 2024-08-13 DIAGNOSIS — R00.2 PALPITATIONS: ICD-10-CM

## 2024-08-13 DIAGNOSIS — R00.0 TACHYCARDIA: ICD-10-CM

## 2024-08-13 LAB
ANION GAP SERPL CALC-SCNC: 7 MMOL/L (ref 0–18)
BUN BLD-MCNC: 14 MG/DL (ref 9–23)
BUN/CREAT SERPL: 18.7 (ref 10–20)
CALCIUM BLD-MCNC: 9.3 MG/DL (ref 8.7–10.4)
CHLORIDE SERPL-SCNC: 107 MMOL/L (ref 98–112)
CO2 SERPL-SCNC: 28 MMOL/L (ref 21–32)
CREAT BLD-MCNC: 0.75 MG/DL
DEPRECATED RDW RBC AUTO: 42.4 FL (ref 35.1–46.3)
EGFRCR SERPLBLD CKD-EPI 2021: 95 ML/MIN/1.73M2 (ref 60–?)
ERYTHROCYTE [DISTWIDTH] IN BLOOD BY AUTOMATED COUNT: 12.2 % (ref 11–15)
FASTING STATUS PATIENT QL REPORTED: NO
GLUCOSE BLD-MCNC: 90 MG/DL (ref 70–99)
HCT VFR BLD AUTO: 37.6 %
HGB BLD-MCNC: 12.7 G/DL
MCH RBC QN AUTO: 31.5 PG (ref 26–34)
MCHC RBC AUTO-ENTMCNC: 33.8 G/DL (ref 31–37)
MCV RBC AUTO: 93.3 FL
OSMOLALITY SERPL CALC.SUM OF ELEC: 294 MOSM/KG (ref 275–295)
PLATELET # BLD AUTO: 294 10(3)UL (ref 150–450)
POTASSIUM SERPL-SCNC: 4.2 MMOL/L (ref 3.5–5.1)
RBC # BLD AUTO: 4.03 X10(6)UL
SODIUM SERPL-SCNC: 142 MMOL/L (ref 136–145)
TSI SER-ACNC: 1.55 MIU/ML (ref 0.55–4.78)
WBC # BLD AUTO: 4.7 X10(3) UL (ref 4–11)

## 2024-08-13 PROCEDURE — 36415 COLL VENOUS BLD VENIPUNCTURE: CPT

## 2024-08-13 PROCEDURE — 80048 BASIC METABOLIC PNL TOTAL CA: CPT

## 2024-08-13 PROCEDURE — 84443 ASSAY THYROID STIM HORMONE: CPT

## 2024-08-13 PROCEDURE — G2211 COMPLEX E/M VISIT ADD ON: HCPCS | Performed by: FAMILY MEDICINE

## 2024-08-13 PROCEDURE — 93000 ELECTROCARDIOGRAM COMPLETE: CPT | Performed by: FAMILY MEDICINE

## 2024-08-13 PROCEDURE — 85027 COMPLETE CBC AUTOMATED: CPT

## 2024-08-13 PROCEDURE — 99214 OFFICE O/P EST MOD 30 MIN: CPT | Performed by: FAMILY MEDICINE

## 2024-08-13 NOTE — PROGRESS NOTES
HPI:    Xena Padron is a 53 year old presents clinic with concerns regarding an elevated heart rate.  Feels symptoms started after starting medication for osteoporosis, Tymlos.  Reports feeling episodes of palpitations, dizziness, anxiousness.  Over the past 2 weeks, has had some sleep disturbance because symptoms.  Unsure if it is related to medication.  Patient has had a few instances in the past with similar symptoms that have self resolved.  Denies chest pain, syncopal episodes, difficulty breathing. No increase in stress, anxiety.     HISTORY:  Past Medical History:    ACNE    ANEMIA    Cervical cyst    Concussion    GERD    Headache    HEADACHES    Skull fracture (HCC)      Past Surgical History:   Procedure Laterality Date    Colonoscopy N/A 2020    Procedure: COLONOSCOPY, POSSIBLE BIOPSY, POSSIBLE POLYPECTOMY 17028;  Surgeon: Sabino Cardona MD;  Location: [facility], [facility]    Hysteroscopy, sterilization  2010    Essure          Tubal ligation        Family History   Problem Relation Age of Onset    Diabetes Maternal Grandfather     Cancer Paternal Grandmother         Colon/lung    Heart Disorder Maternal Grandmother     Heart Disorder Paternal Grandfather     Other (Other) Sister         ASTHMA    Other (Other) Brother         ASTHMA    Asthma Brother     Other (Osteopenia) Mother     Hypertension Mother     High Cholesterol Father     Hypertension Father     Cancer Father         Bladder    Pulmonary Disease Father         COPD    Stroke Father         May 2021      Social History:   Social History     Socioeconomic History    Marital status:    Tobacco Use    Smoking status: Never    Smokeless tobacco: Never   Vaping Use    Vaping status: Never Used   Substance and Sexual Activity    Alcohol use: Yes     Alcohol/week: 1.0 standard drink of alcohol     Types: 1 Glasses of wine per week     Comment: SOCIAL / weekly    Drug use: No    Sexual activity: Yes     Partners: Male      Comment: ESSURE        Medications (Active prior to today's visit):  Current Outpatient Medications   Medication Sig Dispense Refill    Abaloparatide (TYMLOS) 3120 MCG/1.56ML Subcutaneous Solution Pen-injector Inject 80 mcg into the skin daily. 1.56 mL 5    Insulin Pen Needle (BD PEN NEEDLE RANJAN U/F) 32G X 4 MM Does not apply Misc Inject once daily 100 each 1    Estradiol 10 MCG Vaginal Tab Place 10 mcg vaginally twice a week.      Calcium Carb-Cholecalciferol 600-800 MG-UNIT Oral Tab       cholecalciferol 25 MCG (1000 UT) Oral Cap       Polyethylene Glycol 3350 (MIRALAX OR) Take by mouth daily.      Multiple Vitamin (DAILY VITALY) Oral Tab Take 1 tablet by mouth daily.         Allergies:  Allergies   Allergen Reactions    Augmentin [Amoxicillin-Pot Clavulanate] RASH    Ciprofloxacin RASH         Depression Screening (PHQ-2/PHQ-9): Over the LAST 2 WEEKS                         ROS:   Review of Systems   All other systems reviewed and are negative.      PHYSICAL EXAM:     Vitals:    08/13/24 1411   BP: 118/76   BP Location: Right arm   Patient Position: Sitting   Cuff Size: adult   Pulse: 90   Resp: 18   SpO2: 98%   Weight: 129 lb (58.5 kg)   Height: 5' (1.524 m)     Physical Exam  Vitals reviewed.   Constitutional:       General: She is not in acute distress.     Appearance: Normal appearance.   HENT:      Head: Normocephalic and atraumatic.   Cardiovascular:      Rate and Rhythm: Normal rate and regular rhythm.      Heart sounds: Normal heart sounds.   Pulmonary:      Effort: Pulmonary effort is normal. No respiratory distress.      Breath sounds: Normal breath sounds.   Neurological:      Mental Status: She is alert. Mental status is at baseline.   Psychiatric:         Mood and Affect: Mood normal.         ASSESSMENT/PLAN:   (R00.0) Tachycardia  (primary encounter diagnosis)  (R00.2) Palpitations  Plan:   - stable vitals, unremarkable physical exam. EKG - Normal rate, sinus rhythm, normal axis. No ST/T wave  abnormalities. Normal study. Baseline labs ordered. Will check with Dr. Ochoa to see if this is a common side effect with Tymlos. Can also consider a holter monitor. Will continue to follow.                Responsible party/patient verbalized understanding of information discussed. No barriers to learning observed.            Orders This Visit:  Orders Placed This Encounter   Procedures    CBC, Platelet, No Differential [E]    Basic Metabolic Panel (8) [E]    TSH W Reflex To Free T4 [E]       Meds This Visit:  Requested Prescriptions      No prescriptions requested or ordered in this encounter       Imaging & Referrals:  ELECTROCARDIOGRAM, COMPLETE     Chaperone offered at visit today.     The 21st Century cures Act makes medical notes like these available to patients in the interest of transparency.  However, be advised that this is a medical document.  It is intended as peer to peer communication.  It is written in medical language and may contain abbreviations or verbiage that are unfamiliar.  It may appear blunt or direct.  Medical documents are intended to carry relevant information, facts as evident, and the clinical opinion of the practitioner.      This note was created by [employer] voice recognition. Errors in content may be related to improper recognition by the system; efforts to review and correct have been done but errors may still exist. Please contact me with any questions.       8/13/2024  Macho Rosario MD

## 2024-08-14 LAB
ATRIAL RATE: 72 BPM
P AXIS: 0 DEGREES
P-R INTERVAL: 118 MS
Q-T INTERVAL: 390 MS
QRS DURATION: 86 MS
QTC CALCULATION (BEZET): 427 MS
R AXIS: 16 DEGREES
T AXIS: 27 DEGREES
VENTRICULAR RATE: 72 BPM

## 2024-08-15 ENCOUNTER — PATIENT MESSAGE (OUTPATIENT)
Dept: FAMILY MEDICINE CLINIC | Facility: CLINIC | Age: 54
End: 2024-08-15

## 2024-08-15 DIAGNOSIS — R00.0 TACHYCARDIA: Primary | ICD-10-CM

## 2024-08-15 NOTE — TELEPHONE ENCOUNTER
From: Xena Padron  To: Macho Rosario  Sent: 8/15/2024 7:38 AM CDT  Subject: Elevated heart rate follow up    Hi Dr Rosario, I saw your note that ECG and blood work were all normal. I am feeling the same, resting heart rate still elevated around 90. I would be interested in a cardiologist appt and monitor that you suggest. Is it ok to try to work out? We are leaving for [place] on 9/1 for a week, hope this resolves before then.  Thank you,  Xena Padron

## 2024-09-28 ENCOUNTER — LAB ENCOUNTER (OUTPATIENT)
Dept: LAB | Facility: HOSPITAL | Age: 54
End: 2024-09-28
Attending: INTERNAL MEDICINE
Payer: COMMERCIAL

## 2024-09-28 ENCOUNTER — OFFICE VISIT (OUTPATIENT)
Dept: ENDOCRINOLOGY CLINIC | Facility: CLINIC | Age: 54
End: 2024-09-28

## 2024-09-28 VITALS
DIASTOLIC BLOOD PRESSURE: 69 MMHG | WEIGHT: 129 LBS | BODY MASS INDEX: 25.32 KG/M2 | HEIGHT: 60 IN | HEART RATE: 92 BPM | SYSTOLIC BLOOD PRESSURE: 107 MMHG

## 2024-09-28 DIAGNOSIS — M81.0 AGE-RELATED OSTEOPOROSIS WITHOUT CURRENT PATHOLOGICAL FRACTURE: ICD-10-CM

## 2024-09-28 DIAGNOSIS — M81.0 AGE-RELATED OSTEOPOROSIS WITHOUT CURRENT PATHOLOGICAL FRACTURE: Primary | ICD-10-CM

## 2024-09-28 PROCEDURE — 99213 OFFICE O/P EST LOW 20 MIN: CPT | Performed by: INTERNAL MEDICINE

## 2024-09-28 PROCEDURE — 84080 ASSAY ALKALINE PHOSPHATASES: CPT

## 2024-09-28 PROCEDURE — 82523 COLLAGEN CROSSLINKS: CPT

## 2024-09-28 PROCEDURE — 36415 COLL VENOUS BLD VENIPUNCTURE: CPT

## 2024-09-28 NOTE — PROGRESS NOTES
Name: Xena Padron  Date: 9/28/2024    Referring Physician: No ref. provider found    Chief Complaint   Patient presents with    Osteoporosis     Pt is in to follow up. Pt reports no recent falls/fractures/dental work since last visit with MD.          HISTORY OF PRESENT ILLNESS   Xena Padron is a 53 year old female who presents for   Chief Complaint   Patient presents with    Osteoporosis     Pt is in to follow up. Pt reports no recent falls/fractures/dental work since last visit with MD.        54 y/o F presents for follow up evaluation of osteoporosis. No h/o fractures.  No h/o nephrolithiasis.  She is doing weight bearing exercise.  No h/o prednisone therapy.  No h/o tobacco use.     She is maintained on Caltrate 600mg and Vitamin D 2800 units daily.  She is having yogurt and mild in diet.      Menopause at age 49.  No h/o HRT.  She is using vaginal estradiol.  No significant hot flashes.      Of note she is maintained on miralax daily to prevent constipation.     She did undergo secondary evaluation which was negative.  Normal 24 hour urine.  No falls or fracture.     However DEXA demonstrates significant decline in BMD     Since last visit she has been started on Tymlos therapy and overall tolerating well.  She started Medication on June 8.  No myalgias.  She does note some mild palpitations after injection but improves within an hour.     No family h/o osteoporosis.      REVIEW OF SYSTEMS  Eyes: no change in vision  Neurologic: no headache, generalized or focal weakness or numbness.  Head: normal  ENT: normal  Lungs: no shortness of breath, wheezing or BLANKENSHIP  Cardiovascular:  no chest pain or palpitations  Gastrointestinal:  no abdominal pain, bowel movement problems  Musculoskeletal: no muscle pain or arthralgia  /Gyne: no frequency or discomfort while urinating  Psychiatric:  no acute distress, anxiety  or depression  Skin: normal moisturized skin    Medications:     Current Outpatient Medications:      Abaloparatide (TYMLOS) 3120 MCG/1.56ML Subcutaneous Solution Pen-injector, Inject 80 mcg into the skin daily., Disp: 1.56 mL, Rfl: 5    Insulin Pen Needle (BD PEN NEEDLE RANJAN U/F) 32G X 4 MM Does not apply Misc, Inject once daily, Disp: 100 each, Rfl: 1    Estradiol 10 MCG Vaginal Tab, Place 10 mcg vaginally twice a week., Disp: , Rfl:     Calcium Carb-Cholecalciferol 600-800 MG-UNIT Oral Tab, , Disp: , Rfl:     cholecalciferol 25 MCG (1000 UT) Oral Cap, , Disp: , Rfl:     Polyethylene Glycol 3350 (MIRALAX OR), Take by mouth daily., Disp: , Rfl:     Multiple Vitamin (DAILY VITALY) Oral Tab, Take 1 tablet by mouth daily., Disp: , Rfl:      Allergies:   Allergies   Allergen Reactions    Augmentin [Amoxicillin-Pot Clavulanate] RASH    Ciprofloxacin RASH       Social History:   Social History     Socioeconomic History    Marital status:    Tobacco Use    Smoking status: Never    Smokeless tobacco: Never   Vaping Use    Vaping status: Never Used   Substance and Sexual Activity    Alcohol use: Yes     Alcohol/week: 1.0 standard drink of alcohol     Types: 1 Glasses of wine per week     Comment: SOCIAL / weekly    Drug use: No    Sexual activity: Yes     Partners: Male     Comment: ESSURE       Medical History:   Past Medical History:    ACNE    ANEMIA    Cervical cyst    Concussion    GERD    Headache    HEADACHES    Skull fracture (HCC)       Surgical history:   Past Surgical History:   Procedure Laterality Date    Colonoscopy N/A 2020    Procedure: COLONOSCOPY, POSSIBLE BIOPSY, POSSIBLE POLYPECTOMY 62583;  Surgeon: Sabino Cardona MD;  [facility]    Hysteroscopy, sterilization  2010    Essure          Tubal ligation         PHYSICAL EXAMINATION:  /69   Pulse 92   Ht 5' (1.524 m)   Wt 129 lb (58.5 kg)   LMP 2019   BMI 25.19 kg/m²     General Appearance:  Alert, in no acute distress, well developed  Eyes: normal conjunctivae,  sclera.  Ears/Nose/Mouth/Throat/Neck:  normal hearing, normal speech and diffusely enlarged thyroid gland  Musculoskeletal:  normal muscle strength and tone  PV: normal pulses of carotids, pedals  Skin:  normal moisture and skin texture  Hair & Nails:  normal scalp hair     Neuro:  sensory grossly intact and motor grossly intact  Psychiatric:  oriented to time, self, and place  Nutritional:  no abnormal weight gain or loss    ASSESSMENT/PLAN:    1. Osteoporosis   - Discussed diagnosis with patient  - Discussed importance of evaluation for secondary causes of bone loss   - FRAX score is not significantly elevated  - Secondary work up was negative  - DEXA demonstrates worsening osteoporosis and reviewed images with patient  - Now started on Tymlos therapy and tolerating well  - Will plan for one year of therapy then transition to Reclast vs. Prolia   - recheck Bone markers  - DEXA in spring 2025     RTC 8 months     9/28/2024  Dominga Ochoa MD

## 2024-10-02 LAB — ALKALINE PHOSPHATASE BONE SPECIFIC: 18.3 UG/L

## 2024-10-10 LAB — C-TELOPEPTIDE: 259 PG/ML

## 2024-11-19 DIAGNOSIS — M81.0 AGE-RELATED OSTEOPOROSIS WITHOUT CURRENT PATHOLOGICAL FRACTURE: ICD-10-CM

## 2024-11-19 NOTE — TELEPHONE ENCOUNTER
Endocrine refill protocol for anti-resorptive and anabolic agents:     Protocol Criteria: PASSED Reason: N/A    If all below requirements are met, send a 90-day supply with 1 refill per provider protocol.    Verify appointment with Endocrinology completed in the last 12 months or scheduled in the next 6 months.  Confirm timeline for Tymlos and Forteo- patient's time on medication should not have exceeded 2 years.    Start date for Tymlos/Forteo: N/A  Last completed office visit:9/28/2024 Dominga Ochoa MD   Next scheduled follow up:   Future Appointments   Date Time Provider Department Center   5/30/2025  7:30 AM Dominga Ochoa MD [facility]

## 2024-11-20 RX ORDER — ABALOPARATIDE 2000 UG/ML
INJECTION, SOLUTION SUBCUTANEOUS
Qty: 1.56 ML | Refills: 5 | Status: SHIPPED | OUTPATIENT
Start: 2024-11-20

## 2025-05-15 DIAGNOSIS — M81.0 AGE-RELATED OSTEOPOROSIS WITHOUT CURRENT PATHOLOGICAL FRACTURE: ICD-10-CM

## 2025-05-15 RX ORDER — ABALOPARATIDE 2000 UG/ML
INJECTION, SOLUTION SUBCUTANEOUS
Qty: 1.56 ML | Refills: 5 | Status: SHIPPED | OUTPATIENT
Start: 2025-05-15

## 2025-05-15 NOTE — TELEPHONE ENCOUNTER
Endocrine refill protocol for anti-resorptive and anabolic agents:     Protocol Criteria: PASSED Reason: N/A    If all below requirements are met, send a 90-day supply with 1 refill per provider protocol.    Verify appointment with Endocrinology completed in the last 12 months or scheduled in the next 6 months.  Confirm timeline for Tymlos and Forteo- patient's time on medication should not have exceeded 2 years.    Start date for Tymlos/Forteo: 5/17/24  Last completed office visit:9/28/2024 Dominga Ochoa MD   Last completed telemed visit: Visit date not found  Next scheduled follow up:   Future Appointments   Date Time Provider Department Center   5/16/2025  7:20 AM HOB DEXA [facility] HOB DEXA [place]   5/30/2025  7:30 AM Dominga Ochoa MD [facility]

## 2025-05-16 ENCOUNTER — HOSPITAL ENCOUNTER (OUTPATIENT)
Dept: BONE DENSITY | Age: 55
Discharge: HOME | End: 2025-05-16
Attending: INTERNAL MEDICINE
Payer: COMMERCIAL

## 2025-05-16 DIAGNOSIS — M81.0 AGE-RELATED OSTEOPOROSIS WITHOUT CURRENT PATHOLOGICAL FRACTURE: ICD-10-CM

## 2025-05-16 PROCEDURE — 77080 DXA BONE DENSITY AXIAL: CPT | Performed by: INTERNAL MEDICINE

## 2025-05-30 ENCOUNTER — PATIENT MESSAGE (OUTPATIENT)
Dept: ENDOCRINOLOGY CLINIC | Facility: CLINIC | Age: 55
End: 2025-05-30

## 2025-05-30 ENCOUNTER — TELEPHONE (OUTPATIENT)
Dept: ENDOCRINOLOGY CLINIC | Facility: CLINIC | Age: 55
End: 2025-05-30

## 2025-05-30 ENCOUNTER — OFFICE VISIT (OUTPATIENT)
Dept: ENDOCRINOLOGY CLINIC | Facility: CLINIC | Age: 55
End: 2025-05-30
Payer: COMMERCIAL

## 2025-05-30 VITALS
BODY MASS INDEX: 26 KG/M2 | SYSTOLIC BLOOD PRESSURE: 118 MMHG | DIASTOLIC BLOOD PRESSURE: 72 MMHG | HEART RATE: 74 BPM | WEIGHT: 133 LBS

## 2025-05-30 DIAGNOSIS — M85.80 OSTEOPENIA, UNSPECIFIED LOCATION: Primary | ICD-10-CM

## 2025-05-30 DIAGNOSIS — M81.0 OSTEOPOROSIS OF LUMBAR SPINE: Primary | ICD-10-CM

## 2025-05-30 PROCEDURE — 99213 OFFICE O/P EST LOW 20 MIN: CPT | Performed by: INTERNAL MEDICINE

## 2025-05-30 RX ORDER — ZOLEDRONIC ACID 0.05 MG/ML
5 INJECTION, SOLUTION INTRAVENOUS ONCE
OUTPATIENT
Start: 2025-05-30

## 2025-05-30 NOTE — PROGRESS NOTES
Name: Xena Padron  Date: 5/30/2025    Referring Physician: No ref. provider found    Chief Complaint   Patient presents with    Osteoporosis       HISTORY OF PRESENT ILLNESS   Xena Padron is a 54 year old female who presents for   Chief Complaint   Patient presents with    Osteoporosis     55 y/o F presents for follow up evaluation of osteoporosis. No h/o fractures.  No h/o nephrolithiasis.  She is doing weight bearing exercise.  No h/o prednisone therapy.  No h/o tobacco use.     She is maintained on Caltrate 600mg and Vitamin D 2800 units daily.  She is having yogurt and mild in diet.      Menopause at age 49.  No h/o HRT.  She is using vaginal estradiol.  No significant hot flashes.      Of note she is maintained on miralax daily to prevent constipation.     She did undergo secondary evaluation which was negative.  Normal 24 hour urine.  No falls or fracture.     However DEXA demonstrates significant decline in BMD     Since last visit she has been started on Tymlos therapy and overall tolerating well.  She started Medication on June 2024. Today she presents to review DEXA, BMD is significantly improved.      No family h/o osteoporosis.      REVIEW OF SYSTEMS  Eyes: no change in vision  Neurologic: no headache, generalized or focal weakness or numbness.  Head: normal  ENT: normal  Lungs: no shortness of breath, wheezing or BLANKENSHIP  Cardiovascular:  no chest pain or palpitations  Gastrointestinal:  no abdominal pain, bowel movement problems  Musculoskeletal: no muscle pain or arthralgia  /Gyne: no frequency or discomfort while urinating  Psychiatric:  no acute distress, anxiety  or depression  Skin: normal moisturized skin    Medications:     Current Outpatient Medications:     TYMLOS 3120 MCG/1.56ML Subcutaneous Solution Pen-injector, INJECT 80MCG SUBCUTANEOUSLY INTO ABDOMEN ONCE DAILY (DISCARD 30  DAYS AFTER 1ST USE), Disp: 1.56 mL, Rfl: 5    Insulin Pen Needle (BD PEN NEEDLE RANJAN U/F) 32G X 4 MM Does not apply  Misc, Inject once daily, Disp: 100 each, Rfl: 1    Calcium Carb-Cholecalciferol 600-800 MG-UNIT Oral Tab, , Disp: , Rfl:     cholecalciferol 25 MCG (1000 UT) Oral Cap, , Disp: , Rfl:     Polyethylene Glycol 3350 (MIRALAX OR), Take by mouth daily., Disp: , Rfl:     Multiple Vitamin (DAILY VITALY) Oral Tab, Take 1 tablet by mouth daily., Disp: , Rfl:      Allergies:   Allergies   Allergen Reactions    Augmentin [Amoxicillin-Pot Clavulanate] RASH    Ciprofloxacin RASH       Social History:   Social History     Socioeconomic History    Marital status:    Tobacco Use    Smoking status: Never    Smokeless tobacco: Never   Vaping Use    Vaping status: Never Used   Substance and Sexual Activity    Alcohol use: Yes     Alcohol/week: 1.0 standard drink of alcohol     Types: 1 Glasses of wine per week     Comment: SOCIAL / weekly    Drug use: No    Sexual activity: Yes     Partners: Male     Comment: PERLA       Medical History:   Past Medical History:    ACNE    ANEMIA    Cervical cyst    Concussion    GERD    Headache    HEADACHES    Skull fracture (HCC)       Surgical history:   Past Surgical History:   Procedure Laterality Date    Colonoscopy N/A 2020    Procedure: COLONOSCOPY, POSSIBLE BIOPSY, POSSIBLE POLYPECTOMY 79428;  Surgeon: Sabino Cardona MD;  Location: [facility] [place] [facility]    Hysteroscopy, sterilization  2010    Essure          Tubal ligation         PHYSICAL EXAMINATION:  /72   Pulse 74   Wt 133 lb (60.3 kg)   BMI 25.97 kg/m²     General Appearance:  Alert, in no acute distress, well developed  Eyes: normal conjunctivae, sclera.  Ears/Nose/Mouth/Throat/Neck:  normal hearing, normal speech and diffusely enlarged thyroid gland  Musculoskeletal:  normal muscle strength and tone  PV: normal pulses of carotids, pedals  Skin:  normal moisture and skin texture  Hair & Nails:  normal scalp hair     Neuro:  sensory grossly intact and motor grossly intact  Psychiatric:  oriented to  time, self, and place  Nutritional:  no abnormal weight gain or loss    ASSESSMENT/PLAN:    1. Osteoporosis   - Discussed diagnosis with patient  - Discussed importance of evaluation for secondary causes of bone loss   - FRAX score is not significantly elevated  - Secondary work up was negative  - DEXA demonstrates worsening osteoporosis and reviewed images with patient  - Now started on Tymlos therapy and tolerating well  - Reviewed updated DEXA which demonstrates significant improvement with Tymlos, now osteopenia   - Will plan for one year of therapy then transition to Reclast vs. Prolia   - Discussed options at length and she agrees to proceed with Reclast   - Verbalized understanding of risks and benefits of medication     RTC 1 year     5/30/2025  Dominga Ochoa MD

## 2025-06-03 RX ORDER — ZOLEDRONIC ACID 0.05 MG/ML
5 INJECTION, SOLUTION INTRAVENOUS ONCE
Status: CANCELLED | OUTPATIENT
Start: 2025-06-03

## 2025-07-06 ENCOUNTER — LAB ENCOUNTER (OUTPATIENT)
Dept: LAB | Facility: HOSPITAL | Age: 55
End: 2025-07-06
Attending: INTERNAL MEDICINE
Payer: COMMERCIAL

## 2025-07-06 DIAGNOSIS — M81.0 OSTEOPOROSIS OF LUMBAR SPINE: ICD-10-CM

## 2025-07-06 LAB
ALBUMIN SERPL-MCNC: 4.4 G/DL (ref 3.2–4.8)
CALCIUM BLD-MCNC: 8.9 MG/DL (ref 8.7–10.4)
CREAT BLD-MCNC: 0.75 MG/DL (ref 0.55–1.02)
EGFRCR SERPLBLD CKD-EPI 2021: 94 ML/MIN/1.73M2 (ref 60–?)

## 2025-07-06 PROCEDURE — 82040 ASSAY OF SERUM ALBUMIN: CPT

## 2025-07-06 PROCEDURE — 36415 COLL VENOUS BLD VENIPUNCTURE: CPT

## 2025-07-06 PROCEDURE — 82565 ASSAY OF CREATININE: CPT

## 2025-07-06 PROCEDURE — 82310 ASSAY OF CALCIUM: CPT

## 2025-07-11 ENCOUNTER — OFFICE VISIT (OUTPATIENT)
Age: 55
End: 2025-07-11
Attending: INTERNAL MEDICINE
Payer: COMMERCIAL

## 2025-07-11 VITALS
TEMPERATURE: 98 F | HEART RATE: 63 BPM | SYSTOLIC BLOOD PRESSURE: 111 MMHG | DIASTOLIC BLOOD PRESSURE: 63 MMHG | OXYGEN SATURATION: 99 % | RESPIRATION RATE: 18 BRPM

## 2025-07-11 DIAGNOSIS — M81.0 OSTEOPOROSIS OF LUMBAR SPINE: Primary | ICD-10-CM

## 2025-07-11 RX ORDER — ZOLEDRONIC ACID 0.05 MG/ML
5 INJECTION, SOLUTION INTRAVENOUS ONCE
Status: COMPLETED | OUTPATIENT
Start: 2025-07-11 | End: 2025-07-11

## 2025-07-11 RX ORDER — ZOLEDRONIC ACID 0.05 MG/ML
5 INJECTION, SOLUTION INTRAVENOUS ONCE
Status: CANCELLED | OUTPATIENT
Start: 2025-07-11

## 2025-07-11 RX ORDER — ZOLEDRONIC ACID 0.05 MG/ML
INJECTION, SOLUTION INTRAVENOUS
Status: COMPLETED
Start: 2025-07-11 | End: 2025-07-11

## 2025-07-11 RX ADMIN — ZOLEDRONIC ACID 5 MG: 0.05 INJECTION, SOLUTION INTRAVENOUS at 15:41:00

## 2025-07-11 NOTE — PROGRESS NOTES
Pt here for Reclast. Pt denies any issues or concerns.   Denies any recent or planned dental work.     Ordering Provider: [facility]  Order Exp: After this dose.     Pt tolerated infusion without difficulty or complaint. Reviewed next apt date/time: N/A, due next year. New orders needed.      Education Record  Learner:  Patient  Disease / Diagnosis: osteoporosis.  Barriers / Limitations:  None  Method:  Discussion and Reinforcement  General Topics:  Diet, Infection, Medication, Pain, Precautions, Procedure, Side effects and symptom management, and Plan of care reviewed  Outcome:  Shows understanding

## (undated) DIAGNOSIS — M81.0 OSTEOPOROSIS OF LUMBAR SPINE: Primary | ICD-10-CM

## (undated) DIAGNOSIS — D72.818 OTHER DECREASED WHITE BLOOD CELL (WBC) COUNT: Primary | ICD-10-CM

## (undated) NOTE — LETTER
2024              Xena Padron        [address] [facility] [id number]            Xena Padron ( 10/16/1970) is under my care for osteoporosis management. Due to this diagnosis, it requires her to travel with supplies which are medically necessary. These supplies include:    - Medications: Teriparatide, Recombinant, (FORTEO) 600 MCG/2.4ML Subcutaneous Solution Pen-injector   - Pen needles  - Alcohol swabs    The above items cannot be stored in the checked luggage; they must be with the patient in carry-on luggage to avoid instability and access.    According to the drug : The Forteo device can be passed through the x-ray screening equipment at airports. A study demonstrated that x-ray radiation from screening equipment used in airport scanners did not impact the physical or chemical properties of Forteo.    If you have any questions regarding this patient and her medical care and requirements, please contact my office at [phone].        Sincerely,      Electronically signed by: Dominga Ochoa MD  [address] [address] [facility] [address]  [place] [address]  [phone]